# Patient Record
Sex: MALE | Race: WHITE | Employment: FULL TIME | URBAN - METROPOLITAN AREA
[De-identification: names, ages, dates, MRNs, and addresses within clinical notes are randomized per-mention and may not be internally consistent; named-entity substitution may affect disease eponyms.]

---

## 2018-06-22 ENCOUNTER — HOSPITAL ENCOUNTER (EMERGENCY)
Age: 48
Discharge: HOME | End: 2018-06-22
Payer: COMMERCIAL

## 2018-06-22 ENCOUNTER — APPOINTMENT (OUTPATIENT)
Dept: GENERAL RADIOLOGY | Age: 48
End: 2018-06-22
Payer: COMMERCIAL

## 2018-06-22 VITALS
HEIGHT: 70 IN | TEMPERATURE: 98.8 F | SYSTOLIC BLOOD PRESSURE: 148 MMHG | OXYGEN SATURATION: 98 % | HEART RATE: 68 BPM | RESPIRATION RATE: 20 BRPM | WEIGHT: 165 LBS | BODY MASS INDEX: 23.62 KG/M2 | DIASTOLIC BLOOD PRESSURE: 82 MMHG

## 2018-06-22 DIAGNOSIS — M62.838 SPASM OF MUSCLE: Primary | ICD-10-CM

## 2018-06-22 DIAGNOSIS — S16.1XXA ACUTE STRAIN OF NECK MUSCLE, INITIAL ENCOUNTER: ICD-10-CM

## 2018-06-22 LAB
EKG ATRIAL RATE: 62 BPM
EKG P AXIS: 76 DEGREES
EKG P-R INTERVAL: 154 MS
EKG Q-T INTERVAL: 430 MS
EKG QRS DURATION: 98 MS
EKG QTC CALCULATION (BAZETT): 436 MS
EKG R AXIS: 71 DEGREES
EKG T AXIS: 53 DEGREES
EKG VENTRICULAR RATE: 62 BPM

## 2018-06-22 PROCEDURE — 96372 THER/PROPH/DIAG INJ SC/IM: CPT

## 2018-06-22 PROCEDURE — 6370000000 HC RX 637 (ALT 250 FOR IP): Performed by: PHYSICIAN ASSISTANT

## 2018-06-22 PROCEDURE — 6360000002 HC RX W HCPCS: Performed by: PHYSICIAN ASSISTANT

## 2018-06-22 PROCEDURE — 72040 X-RAY EXAM NECK SPINE 2-3 VW: CPT

## 2018-06-22 PROCEDURE — 99283 EMERGENCY DEPT VISIT LOW MDM: CPT

## 2018-06-22 RX ORDER — ORPHENADRINE CITRATE 30 MG/ML
60 INJECTION INTRAMUSCULAR; INTRAVENOUS ONCE
Status: DISCONTINUED | OUTPATIENT
Start: 2018-06-22 | End: 2018-06-22

## 2018-06-22 RX ORDER — KETOROLAC TROMETHAMINE 30 MG/ML
30 INJECTION, SOLUTION INTRAMUSCULAR; INTRAVENOUS EVERY 6 HOURS PRN
Status: DISCONTINUED | OUTPATIENT
Start: 2018-06-22 | End: 2018-06-22 | Stop reason: HOSPADM

## 2018-06-22 RX ORDER — PREDNISONE 20 MG/1
20 TABLET ORAL DAILY
Qty: 15 TABLET | Refills: 0 | Status: SHIPPED | OUTPATIENT
Start: 2018-06-22 | End: 2018-06-29

## 2018-06-22 RX ORDER — ORPHENADRINE CITRATE 30 MG/ML
60 INJECTION INTRAMUSCULAR; INTRAVENOUS ONCE
Status: COMPLETED | OUTPATIENT
Start: 2018-06-22 | End: 2018-06-22

## 2018-06-22 RX ORDER — TIZANIDINE 4 MG/1
4 TABLET ORAL EVERY 6 HOURS PRN
Qty: 21 TABLET | Refills: 0 | Status: SHIPPED | OUTPATIENT
Start: 2018-06-22

## 2018-06-22 RX ORDER — METHYLPREDNISOLONE SODIUM SUCCINATE 125 MG/2ML
125 INJECTION, POWDER, LYOPHILIZED, FOR SOLUTION INTRAMUSCULAR; INTRAVENOUS ONCE
Status: DISCONTINUED | OUTPATIENT
Start: 2018-06-22 | End: 2018-06-22

## 2018-06-22 RX ORDER — ACETAMINOPHEN AND CODEINE PHOSPHATE 300; 30 MG/1; MG/1
1 TABLET ORAL EVERY 4 HOURS PRN
Qty: 30 TABLET | Refills: 0 | Status: SHIPPED | OUTPATIENT
Start: 2018-06-22 | End: 2018-06-27

## 2018-06-22 RX ORDER — HYDROCODONE BITARTRATE AND ACETAMINOPHEN 5; 325 MG/1; MG/1
1 TABLET ORAL ONCE
Status: COMPLETED | OUTPATIENT
Start: 2018-06-22 | End: 2018-06-22

## 2018-06-22 RX ADMIN — KETOROLAC TROMETHAMINE 30 MG: 30 INJECTION, SOLUTION INTRAMUSCULAR at 21:14

## 2018-06-22 RX ADMIN — HYDROCODONE BITARTRATE AND ACETAMINOPHEN 1 TABLET: 5; 325 TABLET ORAL at 19:54

## 2018-06-22 RX ADMIN — ORPHENADRINE CITRATE 60 MG: 30 INJECTION INTRAMUSCULAR; INTRAVENOUS at 19:54

## 2018-06-22 ASSESSMENT — PAIN SCALES - WONG BAKER: WONGBAKER_NUMERICALRESPONSE: 2

## 2018-06-22 ASSESSMENT — PAIN DESCRIPTION - LOCATION
LOCATION: NECK;BACK
LOCATION: NECK

## 2018-06-22 ASSESSMENT — PAIN DESCRIPTION - PAIN TYPE
TYPE: ACUTE PAIN
TYPE: ACUTE PAIN

## 2018-06-22 ASSESSMENT — PAIN SCALES - GENERAL
PAINLEVEL_OUTOF10: 8
PAINLEVEL_OUTOF10: 10
PAINLEVEL_OUTOF10: 5
PAINLEVEL_OUTOF10: 10

## 2018-06-22 ASSESSMENT — PAIN DESCRIPTION - ORIENTATION: ORIENTATION: LEFT

## 2019-05-07 ENCOUNTER — HOSPITAL ENCOUNTER (OUTPATIENT)
Age: 49
Discharge: HOME | End: 2019-05-09

## 2019-05-07 PROCEDURE — 88305 TISSUE EXAM BY PATHOLOGIST: CPT

## 2021-12-10 ENCOUNTER — HOSPITAL ENCOUNTER (OUTPATIENT)
Dept: HOSPITAL 83 - COVID19 | Age: 51
Discharge: HOME | End: 2021-12-10
Attending: STUDENT IN AN ORGANIZED HEALTH CARE EDUCATION/TRAINING PROGRAM
Payer: COMMERCIAL

## 2021-12-10 DIAGNOSIS — Z11.52: Primary | ICD-10-CM

## 2022-08-17 ENCOUNTER — HOSPITAL ENCOUNTER (OUTPATIENT)
Age: 52
Discharge: HOME | End: 2022-08-19

## 2022-08-17 PROCEDURE — 88305 TISSUE EXAM BY PATHOLOGIST: CPT

## 2023-02-23 ENCOUNTER — HOSPITAL ENCOUNTER (OUTPATIENT)
Dept: GENERAL RADIOLOGY | Age: 53
Discharge: HOME | End: 2023-02-25
Payer: COMMERCIAL

## 2023-02-23 ENCOUNTER — HOSPITAL ENCOUNTER (OUTPATIENT)
Age: 53
Discharge: HOME | End: 2023-02-25
Payer: COMMERCIAL

## 2023-02-23 DIAGNOSIS — M25.511 RIGHT SHOULDER PAIN, UNSPECIFIED CHRONICITY: ICD-10-CM

## 2023-02-23 DIAGNOSIS — S43.401A UNSPECIFIED SPRAIN OF RIGHT SHOULDER JOINT, INITIAL ENCOUNTER: ICD-10-CM

## 2023-02-23 DIAGNOSIS — S43.51XA SPRAIN OF ACROMIOCLAVICULAR JOINT, RIGHT, INITIAL ENCOUNTER: ICD-10-CM

## 2023-02-23 PROCEDURE — 73030 X-RAY EXAM OF SHOULDER: CPT

## 2024-03-11 ENCOUNTER — HOSPITAL ENCOUNTER (OUTPATIENT)
Age: 54
Discharge: HOME | End: 2024-03-13

## 2024-03-11 PROCEDURE — 88305 TISSUE EXAM BY PATHOLOGIST: CPT

## 2024-03-11 PROCEDURE — 88342 IMHCHEM/IMCYTCHM 1ST ANTB: CPT

## 2024-03-14 LAB — SURGICAL PATHOLOGY REPORT: NORMAL

## 2025-07-07 ENCOUNTER — APPOINTMENT (OUTPATIENT)
Dept: GENERAL RADIOLOGY | Age: 55
DRG: 321 | End: 2025-07-07
Payer: COMMERCIAL

## 2025-07-07 ENCOUNTER — APPOINTMENT (OUTPATIENT)
Dept: MRI IMAGING | Age: 55
DRG: 321 | End: 2025-07-07
Payer: COMMERCIAL

## 2025-07-07 ENCOUNTER — HOSPITAL ENCOUNTER (INPATIENT)
Age: 55
LOS: 9 days | Discharge: HOME | DRG: 321 | End: 2025-07-16
Attending: EMERGENCY MEDICINE | Admitting: INTERNAL MEDICINE
Payer: COMMERCIAL

## 2025-07-07 ENCOUNTER — APPOINTMENT (OUTPATIENT)
Dept: CT IMAGING | Age: 55
DRG: 321 | End: 2025-07-07
Payer: COMMERCIAL

## 2025-07-07 DIAGNOSIS — I21.3 STEMI (ST ELEVATION MYOCARDIAL INFARCTION) (HCC): ICD-10-CM

## 2025-07-07 DIAGNOSIS — R07.9 CHEST PAIN, UNSPECIFIED TYPE: ICD-10-CM

## 2025-07-07 DIAGNOSIS — I21.3 ST ELEVATION MYOCARDIAL INFARCTION (STEMI), UNSPECIFIED ARTERY (HCC): Primary | ICD-10-CM

## 2025-07-07 DIAGNOSIS — J96.01 ACUTE RESPIRATORY FAILURE WITH HYPOXIA (HCC): ICD-10-CM

## 2025-07-07 LAB
AADO2: 237.3 MMHG
AADO2: 409.8 MMHG
AADO2: 610.1 MMHG
ABO + RH BLD: NORMAL
ACTIVATED CLOTTING TIME, LOW RANGE: 273 SEC
ACTIVATED CLOTTING TIME, LOW RANGE: 346 SEC
ACTIVATED CLOTTING TIME, LOW RANGE: 348 SEC
ALBUMIN SERPL-MCNC: 4.2 G/DL (ref 3.5–5.2)
ALBUMIN SERPL-MCNC: 4.6 G/DL (ref 3.5–5.2)
ALP SERPL-CCNC: 81 U/L (ref 40–129)
ALP SERPL-CCNC: 90 U/L (ref 40–129)
ALT SERPL-CCNC: 153 U/L (ref 0–50)
ALT SERPL-CCNC: 189 U/L (ref 0–50)
ANION GAP SERPL CALCULATED.3IONS-SCNC: 18 MMOL/L (ref 7–16)
ANION GAP SERPL CALCULATED.3IONS-SCNC: 18 MMOL/L (ref 7–16)
ARM BAND NUMBER: NORMAL
AST SERPL-CCNC: 142 U/L (ref 0–50)
AST SERPL-CCNC: 619 U/L (ref 0–50)
B.E.: -10.4 MMOL/L (ref -3–3)
B.E.: -3.5 MMOL/L (ref -3–3)
B.E.: -5 MMOL/L (ref -3–3)
BASOPHILS # BLD: 0.03 K/UL (ref 0–0.2)
BASOPHILS # BLD: 0.07 K/UL (ref 0–0.2)
BASOPHILS NFR BLD: 0 % (ref 0–2)
BASOPHILS NFR BLD: 1 % (ref 0–2)
BILIRUB SERPL-MCNC: 0.3 MG/DL (ref 0–1.2)
BILIRUB SERPL-MCNC: 0.6 MG/DL (ref 0–1.2)
BLOOD BANK SAMPLE EXPIRATION: NORMAL
BLOOD GROUP ANTIBODIES SERPL: NEGATIVE
BUN BLD-MCNC: 14 MG/DL (ref 6–20)
BUN SERPL-MCNC: 14 MG/DL (ref 6–20)
BUN SERPL-MCNC: 16 MG/DL (ref 6–20)
CALCIUM SERPL-MCNC: 8.9 MG/DL (ref 8.6–10)
CALCIUM SERPL-MCNC: 9.5 MG/DL (ref 8.6–10)
CHLORIDE BLD-SCNC: 106 MMOL/L (ref 100–108)
CHLORIDE SERPL-SCNC: 103 MMOL/L (ref 98–107)
CHLORIDE SERPL-SCNC: 104 MMOL/L (ref 98–107)
CK SERPL-CCNC: 6602 U/L (ref 0–190)
CO2 BLD CALC-SCNC: 16 MMOL/L (ref 22–29)
CO2 SERPL-SCNC: 15 MMOL/L (ref 22–29)
CO2 SERPL-SCNC: 17 MMOL/L (ref 22–29)
COHB: 0.3 % (ref 0–1.5)
COHB: 0.9 % (ref 0–1.5)
COHB: 2.2 % (ref 0–1.5)
CREAT BLD-MCNC: 1.2 MG/DL (ref 0.7–1.2)
CREAT SERPL-MCNC: 1.2 MG/DL (ref 0.7–1.2)
CREAT SERPL-MCNC: 1.2 MG/DL (ref 0.7–1.2)
CRITICAL: ABNORMAL
DATE ANALYZED: ABNORMAL
DATE OF COLLECTION: ABNORMAL
ECHO BSA: 1.92 M2
EGFR, POC: 73 ML/MIN/1.73M2
EOSINOPHIL # BLD: 0 K/UL (ref 0.05–0.5)
EOSINOPHIL # BLD: 0.02 K/UL (ref 0.05–0.5)
EOSINOPHILS RELATIVE PERCENT: 0 % (ref 0–6)
EOSINOPHILS RELATIVE PERCENT: 0 % (ref 0–6)
ERYTHROCYTE [DISTWIDTH] IN BLOOD BY AUTOMATED COUNT: 13.2 % (ref 11.5–15)
ERYTHROCYTE [DISTWIDTH] IN BLOOD BY AUTOMATED COUNT: 13.3 % (ref 11.5–15)
ERYTHROCYTE [DISTWIDTH] IN BLOOD BY AUTOMATED COUNT: 13.3 % (ref 11.5–15)
FIO2: 100 %
GFR, ESTIMATED: 71 ML/MIN/1.73M2
GFR, ESTIMATED: 74 ML/MIN/1.73M2
GLUCOSE BLD-MCNC: 216 MG/DL
GLUCOSE BLD-MCNC: 259 MG/DL (ref 74–99)
GLUCOSE SERPL-MCNC: 138 MG/DL (ref 74–99)
GLUCOSE SERPL-MCNC: 246 MG/DL (ref 74–99)
HCO3: 16.3 MMOL/L (ref 22–26)
HCO3: 18.2 MMOL/L (ref 22–26)
HCO3: 18.8 MMOL/L (ref 22–26)
HCT VFR BLD AUTO: 47.5 % (ref 37–54)
HCT VFR BLD AUTO: 47.6 % (ref 37–54)
HCT VFR BLD AUTO: 49.6 % (ref 37–54)
HGB BLD-MCNC: 16.5 G/DL (ref 12.5–16.5)
HGB BLD-MCNC: 17 G/DL (ref 12.5–16.5)
HGB BLD-MCNC: 17.2 G/DL (ref 12.5–16.5)
HHB: 0.4 % (ref 0–5)
HHB: 0.7 % (ref 0–5)
HHB: 13 % (ref 0–5)
IMM GRANULOCYTES # BLD AUTO: 0.06 K/UL (ref 0–0.58)
IMM GRANULOCYTES # BLD AUTO: 0.15 K/UL (ref 0–0.58)
IMM GRANULOCYTES NFR BLD: 0 % (ref 0–5)
IMM GRANULOCYTES NFR BLD: 1 % (ref 0–5)
INR PPP: 1.1
LAB: ABNORMAL
LACTATE BLDV-SCNC: 1.6 MMOL/L (ref 0.5–2.2)
LACTATE BLDV-SCNC: 4.6 MMOL/L (ref 0.5–2.2)
LYMPHOCYTES NFR BLD: 1.7 K/UL (ref 1.5–4)
LYMPHOCYTES NFR BLD: 1.72 K/UL (ref 1.5–4)
LYMPHOCYTES RELATIVE PERCENT: 12 % (ref 20–42)
LYMPHOCYTES RELATIVE PERCENT: 12 % (ref 20–42)
Lab: 1225
Lab: 1655
Lab: 2025
MAGNESIUM SERPL-MCNC: 2 MG/DL (ref 1.6–2.6)
MCH RBC QN AUTO: 32.8 PG (ref 26–35)
MCH RBC QN AUTO: 33.4 PG (ref 26–35)
MCH RBC QN AUTO: 33.5 PG (ref 26–35)
MCHC RBC AUTO-ENTMCNC: 34.7 G/DL (ref 32–34.5)
MCHC RBC AUTO-ENTMCNC: 34.7 G/DL (ref 32–34.5)
MCHC RBC AUTO-ENTMCNC: 35.7 G/DL (ref 32–34.5)
MCV RBC AUTO: 93.7 FL (ref 80–99.9)
MCV RBC AUTO: 94.5 FL (ref 80–99.9)
MCV RBC AUTO: 96.2 FL (ref 80–99.9)
METHB: 0.5 % (ref 0–1.5)
METHB: 0.6 % (ref 0–1.5)
METHB: 0.7 % (ref 0–1.5)
MODE: AC
MODE: AC
MONOCYTES NFR BLD: 0.52 K/UL (ref 0.1–0.95)
MONOCYTES NFR BLD: 0.73 K/UL (ref 0.1–0.95)
MONOCYTES NFR BLD: 4 % (ref 2–12)
MONOCYTES NFR BLD: 5 % (ref 2–12)
NEUTROPHILS NFR BLD: 83 % (ref 43–80)
NEUTROPHILS NFR BLD: 83 % (ref 43–80)
NEUTS SEG NFR BLD: 12.04 K/UL (ref 1.8–7.3)
NEUTS SEG NFR BLD: 12.31 K/UL (ref 1.8–7.3)
O2 SATURATION: 86.8 % (ref 92–98.5)
O2 SATURATION: 99.3 % (ref 92–98.5)
O2 SATURATION: 99.6 % (ref 92–98.5)
O2HB: 85.6 % (ref 94–97)
O2HB: 96.5 % (ref 94–97)
O2HB: 98.6 % (ref 94–97)
OPERATOR ID: 405
OPERATOR ID: 5100
OPERATOR ID: 5100
PARTIAL THROMBOPLASTIN TIME: 122.1 SEC (ref 24.5–35.1)
PARTIAL THROMBOPLASTIN TIME: 31.4 SEC (ref 24.5–35.1)
PATIENT TEMP: 37 C
PCO2: 28.3 MMHG (ref 35–45)
PCO2: 30 MMHG (ref 35–45)
PCO2: 39.1 MMHG (ref 35–45)
PEEP/CPAP: 8 CMH2O
PEEP/CPAP: 8 CMH2O
PFO2: 0.48 MMHG/%
PFO2: 2.39 MMHG/%
PFO2: 4.47 MMHG/%
PH BLOOD GAS: 7.24 (ref 7.35–7.45)
PH BLOOD GAS: 7.4 (ref 7.35–7.45)
PH BLOOD GAS: 7.44 (ref 7.35–7.45)
PHOSPHATE SERPL-MCNC: 4.2 MG/DL (ref 2.5–4.5)
PLATELET # BLD AUTO: 240 K/UL (ref 130–450)
PLATELET # BLD AUTO: 248 K/UL (ref 130–450)
PLATELET # BLD AUTO: 266 K/UL (ref 130–450)
PMV BLD AUTO: 11.2 FL (ref 7–12)
PMV BLD AUTO: 11.5 FL (ref 7–12)
PMV BLD AUTO: 11.6 FL (ref 7–12)
PO2: 239.1 MMHG (ref 75–100)
PO2: 447.4 MMHG (ref 75–100)
PO2: 47.9 MMHG (ref 75–100)
POC ANION GAP: 14 MMOL/L (ref 7–16)
POTASSIUM BLD-SCNC: 3.7 MMOL/L (ref 3.5–5)
POTASSIUM SERPL-SCNC: 3.51 MMOL/L (ref 3.5–5)
POTASSIUM SERPL-SCNC: 3.8 MMOL/L (ref 3.5–5.1)
POTASSIUM SERPL-SCNC: 4.6 MMOL/L (ref 3.5–5.1)
PROCALCITONIN SERPL-MCNC: 0.91 NG/ML (ref 0–0.08)
PROT SERPL-MCNC: 6.6 G/DL (ref 6.4–8.3)
PROT SERPL-MCNC: 7.1 G/DL (ref 6.4–8.3)
PROTHROMBIN TIME: 11.5 SEC (ref 9.3–12.4)
RBC # BLD AUTO: 4.94 M/UL (ref 3.8–5.8)
RBC # BLD AUTO: 5.08 M/UL (ref 3.8–5.8)
RBC # BLD AUTO: 5.25 M/UL (ref 3.8–5.8)
RI(T): 0.53
RI(T): 1.71
RI(T): 12.74
RR MECHANICAL: 18 B/MIN
RR MECHANICAL: 20 B/MIN
SODIUM BLD-SCNC: 136 MMOL/L (ref 132–146)
SODIUM SERPL-SCNC: 136 MMOL/L (ref 136–145)
SODIUM SERPL-SCNC: 139 MMOL/L (ref 136–145)
SOURCE, BLOOD GAS: ABNORMAL
THB: 16.3 G/DL (ref 11.5–16.5)
THB: 17.5 G/DL (ref 11.5–16.5)
THB: 17.8 G/DL (ref 11.5–16.5)
TIME ANALYZED: 1226
TIME ANALYZED: 1657
TIME ANALYZED: 2027
TROPONIN I SERPL HS-MCNC: 50 NG/L (ref 0–22)
TROPONIN I SERPL HS-MCNC: 8709 NG/L (ref 0–22)
VT MECHANICAL: 450 ML
VT MECHANICAL: 450 ML
WBC OTHER # BLD: 14.6 K/UL (ref 4.5–11.5)
WBC OTHER # BLD: 14.8 K/UL (ref 4.5–11.5)
WBC OTHER # BLD: 17.6 K/UL (ref 4.5–11.5)

## 2025-07-07 PROCEDURE — 92941 PRQ TRLML REVSC TOT OCCL AMI: CPT | Performed by: INTERNAL MEDICINE

## 2025-07-07 PROCEDURE — 99291 CRITICAL CARE FIRST HOUR: CPT | Performed by: INTERNAL MEDICINE

## 2025-07-07 PROCEDURE — 2580000003 HC RX 258: Performed by: INTERNAL MEDICINE

## 2025-07-07 PROCEDURE — 2500000003 HC RX 250 WO HCPCS: Performed by: INTERNAL MEDICINE

## 2025-07-07 PROCEDURE — 84484 ASSAY OF TROPONIN QUANT: CPT

## 2025-07-07 PROCEDURE — C1757 CATH, THROMBECTOMY/EMBOLECT: HCPCS | Performed by: INTERNAL MEDICINE

## 2025-07-07 PROCEDURE — 6360000004 HC RX CONTRAST MEDICATION: Performed by: INTERNAL MEDICINE

## 2025-07-07 PROCEDURE — 6360000002 HC RX W HCPCS: Performed by: EMERGENCY MEDICINE

## 2025-07-07 PROCEDURE — B2111ZZ FLUOROSCOPY OF MULTIPLE CORONARY ARTERIES USING LOW OSMOLAR CONTRAST: ICD-10-PCS | Performed by: INTERNAL MEDICINE

## 2025-07-07 PROCEDURE — 80053 COMPREHEN METABOLIC PANEL: CPT

## 2025-07-07 PROCEDURE — 0BH17EZ INSERTION OF ENDOTRACHEAL AIRWAY INTO TRACHEA, VIA NATURAL OR ARTIFICIAL OPENING: ICD-10-PCS

## 2025-07-07 PROCEDURE — 6370000000 HC RX 637 (ALT 250 FOR IP): Performed by: INTERNAL MEDICINE

## 2025-07-07 PROCEDURE — 85347 COAGULATION TIME ACTIVATED: CPT

## 2025-07-07 PROCEDURE — 80051 ELECTROLYTE PANEL: CPT

## 2025-07-07 PROCEDURE — 87081 CULTURE SCREEN ONLY: CPT

## 2025-07-07 PROCEDURE — 92973 PRQ TRLUML C MCHN ASP THRMBC: CPT | Performed by: INTERNAL MEDICINE

## 2025-07-07 PROCEDURE — C1874 STENT, COATED/COV W/DEL SYS: HCPCS | Performed by: INTERNAL MEDICINE

## 2025-07-07 PROCEDURE — 85025 COMPLETE CBC W/AUTO DIFF WBC: CPT

## 2025-07-07 PROCEDURE — 74018 RADEX ABDOMEN 1 VIEW: CPT

## 2025-07-07 PROCEDURE — 84100 ASSAY OF PHOSPHORUS: CPT

## 2025-07-07 PROCEDURE — 84132 ASSAY OF SERUM POTASSIUM: CPT

## 2025-07-07 PROCEDURE — 94640 AIRWAY INHALATION TREATMENT: CPT

## 2025-07-07 PROCEDURE — 0202U NFCT DS 22 TRGT SARS-COV-2: CPT

## 2025-07-07 PROCEDURE — A9579 GAD-BASE MR CONTRAST NOS,1ML: HCPCS | Performed by: RADIOLOGY

## 2025-07-07 PROCEDURE — 83735 ASSAY OF MAGNESIUM: CPT

## 2025-07-07 PROCEDURE — 83605 ASSAY OF LACTIC ACID: CPT

## 2025-07-07 PROCEDURE — 70450 CT HEAD/BRAIN W/O DYE: CPT

## 2025-07-07 PROCEDURE — 86850 RBC ANTIBODY SCREEN: CPT

## 2025-07-07 PROCEDURE — 2709999900 HC NON-CHARGEABLE SUPPLY: Performed by: INTERNAL MEDICINE

## 2025-07-07 PROCEDURE — 6360000002 HC RX W HCPCS: Performed by: INTERNAL MEDICINE

## 2025-07-07 PROCEDURE — 85610 PROTHROMBIN TIME: CPT

## 2025-07-07 PROCEDURE — 93005 ELECTROCARDIOGRAM TRACING: CPT | Performed by: EMERGENCY MEDICINE

## 2025-07-07 PROCEDURE — 86901 BLOOD TYPING SEROLOGIC RH(D): CPT

## 2025-07-07 PROCEDURE — 4A023N7 MEASUREMENT OF CARDIAC SAMPLING AND PRESSURE, LEFT HEART, PERCUTANEOUS APPROACH: ICD-10-PCS | Performed by: INTERNAL MEDICINE

## 2025-07-07 PROCEDURE — 82805 BLOOD GASES W/O2 SATURATION: CPT

## 2025-07-07 PROCEDURE — 99291 CRITICAL CARE FIRST HOUR: CPT | Performed by: NURSE PRACTITIONER

## 2025-07-07 PROCEDURE — 93458 L HRT ARTERY/VENTRICLE ANGIO: CPT | Performed by: INTERNAL MEDICINE

## 2025-07-07 PROCEDURE — 85730 THROMBOPLASTIN TIME PARTIAL: CPT

## 2025-07-07 PROCEDURE — 85027 COMPLETE CBC AUTOMATED: CPT

## 2025-07-07 PROCEDURE — 6360000002 HC RX W HCPCS

## 2025-07-07 PROCEDURE — C1769 GUIDE WIRE: HCPCS | Performed by: INTERNAL MEDICINE

## 2025-07-07 PROCEDURE — 6370000000 HC RX 637 (ALT 250 FOR IP): Performed by: NURSE PRACTITIONER

## 2025-07-07 PROCEDURE — 2500000003 HC RX 250 WO HCPCS: Performed by: NURSE PRACTITIONER

## 2025-07-07 PROCEDURE — 92928 PRQ TCAT PLMT NTRAC ST 1 LES: CPT | Performed by: INTERNAL MEDICINE

## 2025-07-07 PROCEDURE — 02C03ZZ EXTIRPATION OF MATTER FROM CORONARY ARTERY, ONE ARTERY, PERCUTANEOUS APPROACH: ICD-10-PCS | Performed by: INTERNAL MEDICINE

## 2025-07-07 PROCEDURE — 94002 VENT MGMT INPAT INIT DAY: CPT

## 2025-07-07 PROCEDURE — C1887 CATHETER, GUIDING: HCPCS | Performed by: INTERNAL MEDICINE

## 2025-07-07 PROCEDURE — 5A12012 PERFORMANCE OF CARDIAC OUTPUT, SINGLE, MANUAL: ICD-10-PCS | Performed by: INTERNAL MEDICINE

## 2025-07-07 PROCEDURE — C1725 CATH, TRANSLUMIN NON-LASER: HCPCS | Performed by: INTERNAL MEDICINE

## 2025-07-07 PROCEDURE — 6370000000 HC RX 637 (ALT 250 FOR IP)

## 2025-07-07 PROCEDURE — 31500 INSERT EMERGENCY AIRWAY: CPT

## 2025-07-07 PROCEDURE — 86900 BLOOD TYPING SEROLOGIC ABO: CPT

## 2025-07-07 PROCEDURE — 99285 EMERGENCY DEPT VISIT HI MDM: CPT

## 2025-07-07 PROCEDURE — 2500000003 HC RX 250 WO HCPCS

## 2025-07-07 PROCEDURE — 71045 X-RAY EXAM CHEST 1 VIEW: CPT

## 2025-07-07 PROCEDURE — 6370000000 HC RX 637 (ALT 250 FOR IP): Performed by: EMERGENCY MEDICINE

## 2025-07-07 PROCEDURE — 84145 PROCALCITONIN (PCT): CPT

## 2025-07-07 PROCEDURE — 027135Z DILATION OF CORONARY ARTERY, TWO ARTERIES WITH TWO DRUG-ELUTING INTRALUMINAL DEVICES, PERCUTANEOUS APPROACH: ICD-10-PCS | Performed by: INTERNAL MEDICINE

## 2025-07-07 PROCEDURE — 2000000000 HC ICU R&B

## 2025-07-07 PROCEDURE — 6360000002 HC RX W HCPCS: Performed by: NURSE PRACTITIONER

## 2025-07-07 PROCEDURE — 70553 MRI BRAIN STEM W/O & W/DYE: CPT

## 2025-07-07 PROCEDURE — 5A1945Z RESPIRATORY VENTILATION, 24-96 CONSECUTIVE HOURS: ICD-10-PCS | Performed by: INTERNAL MEDICINE

## 2025-07-07 PROCEDURE — 6360000004 HC RX CONTRAST MEDICATION: Performed by: RADIOLOGY

## 2025-07-07 PROCEDURE — 82550 ASSAY OF CK (CPK): CPT

## 2025-07-07 PROCEDURE — 84520 ASSAY OF UREA NITROGEN: CPT

## 2025-07-07 PROCEDURE — 82565 ASSAY OF CREATININE: CPT

## 2025-07-07 PROCEDURE — C1894 INTRO/SHEATH, NON-LASER: HCPCS | Performed by: INTERNAL MEDICINE

## 2025-07-07 PROCEDURE — 82947 ASSAY GLUCOSE BLOOD QUANT: CPT

## 2025-07-07 DEVICE — STENT ONYXNG35018UX ONYX 3.50X18RX
Type: IMPLANTABLE DEVICE | Status: FUNCTIONAL
Brand: ONYX FRONTIER™

## 2025-07-07 DEVICE — STENT ONYXNG35015UX ONYX 3.50X15RX
Type: IMPLANTABLE DEVICE | Status: FUNCTIONAL
Brand: ONYX FRONTIER™

## 2025-07-07 RX ORDER — VERAPAMIL HYDROCHLORIDE 2.5 MG/ML
INJECTION INTRAVENOUS PRN
Status: DISCONTINUED | OUTPATIENT
Start: 2025-07-07 | End: 2025-07-07 | Stop reason: HOSPADM

## 2025-07-07 RX ORDER — ETOMIDATE 2 MG/ML
20 INJECTION INTRAVENOUS ONCE
Status: COMPLETED | OUTPATIENT
Start: 2025-07-07 | End: 2025-07-07

## 2025-07-07 RX ORDER — ROCURONIUM BROMIDE 10 MG/ML
100 INJECTION, SOLUTION INTRAVENOUS ONCE
Status: COMPLETED | OUTPATIENT
Start: 2025-07-07 | End: 2025-07-07

## 2025-07-07 RX ORDER — MAGNESIUM SULFATE IN WATER 40 MG/ML
2000 INJECTION, SOLUTION INTRAVENOUS PRN
Status: DISCONTINUED | OUTPATIENT
Start: 2025-07-07 | End: 2025-07-16 | Stop reason: HOSPADM

## 2025-07-07 RX ORDER — ACETAMINOPHEN 325 MG/1
650 TABLET ORAL EVERY 4 HOURS PRN
Status: DISCONTINUED | OUTPATIENT
Start: 2025-07-07 | End: 2025-07-10

## 2025-07-07 RX ORDER — NITROGLYCERIN 20 MG/100ML
INJECTION INTRAVENOUS PRN
Status: DISCONTINUED | OUTPATIENT
Start: 2025-07-07 | End: 2025-07-07 | Stop reason: HOSPADM

## 2025-07-07 RX ORDER — CHLORHEXIDINE GLUCONATE ORAL RINSE 1.2 MG/ML
15 SOLUTION DENTAL 2 TIMES DAILY
Status: DISCONTINUED | OUTPATIENT
Start: 2025-07-07 | End: 2025-07-10

## 2025-07-07 RX ORDER — ASPIRIN 81 MG/1
81 TABLET ORAL DAILY
Status: DISCONTINUED | OUTPATIENT
Start: 2025-07-07 | End: 2025-07-07

## 2025-07-07 RX ORDER — SODIUM CHLORIDE 9 MG/ML
INJECTION, SOLUTION INTRAVENOUS CONTINUOUS
Status: ACTIVE | OUTPATIENT
Start: 2025-07-07 | End: 2025-07-07

## 2025-07-07 RX ORDER — ASPIRIN 325 MG
325 TABLET ORAL ONCE
Status: COMPLETED | OUTPATIENT
Start: 2025-07-07 | End: 2025-07-07

## 2025-07-07 RX ORDER — IOPAMIDOL 755 MG/ML
INJECTION, SOLUTION INTRAVASCULAR PRN
Status: DISCONTINUED | OUTPATIENT
Start: 2025-07-07 | End: 2025-07-07 | Stop reason: HOSPADM

## 2025-07-07 RX ORDER — LOSARTAN POTASSIUM 25 MG/1
25 TABLET ORAL DAILY
Status: DISCONTINUED | OUTPATIENT
Start: 2025-07-07 | End: 2025-07-16 | Stop reason: HOSPADM

## 2025-07-07 RX ORDER — SODIUM CHLORIDE 0.9 % (FLUSH) 0.9 %
5-40 SYRINGE (ML) INJECTION EVERY 12 HOURS SCHEDULED
Status: DISCONTINUED | OUTPATIENT
Start: 2025-07-07 | End: 2025-07-16 | Stop reason: HOSPADM

## 2025-07-07 RX ORDER — ROSUVASTATIN CALCIUM 20 MG/1
40 TABLET, COATED ORAL NIGHTLY
Status: DISCONTINUED | OUTPATIENT
Start: 2025-07-07 | End: 2025-07-16 | Stop reason: HOSPADM

## 2025-07-07 RX ORDER — MIDAZOLAM HYDROCHLORIDE 1 MG/ML
1-10 INJECTION, SOLUTION INTRAVENOUS CONTINUOUS
Status: DISCONTINUED | OUTPATIENT
Start: 2025-07-07 | End: 2025-07-10

## 2025-07-07 RX ORDER — PROPOFOL 10 MG/ML
5-50 INJECTION, EMULSION INTRAVENOUS CONTINUOUS
Status: DISCONTINUED | OUTPATIENT
Start: 2025-07-07 | End: 2025-07-10

## 2025-07-07 RX ORDER — HEPARIN SODIUM 1000 [USP'U]/ML
2000 INJECTION, SOLUTION INTRAVENOUS; SUBCUTANEOUS PRN
Status: DISCONTINUED | OUTPATIENT
Start: 2025-07-07 | End: 2025-07-07

## 2025-07-07 RX ORDER — HEPARIN SODIUM 10000 [USP'U]/100ML
5-30 INJECTION, SOLUTION INTRAVENOUS CONTINUOUS
Status: DISCONTINUED | OUTPATIENT
Start: 2025-07-07 | End: 2025-07-07

## 2025-07-07 RX ORDER — SODIUM CHLORIDE 9 MG/ML
INJECTION, SOLUTION INTRAVENOUS PRN
Status: DISCONTINUED | OUTPATIENT
Start: 2025-07-07 | End: 2025-07-16 | Stop reason: HOSPADM

## 2025-07-07 RX ORDER — GADOTERIDOL 279.3 MG/ML
15 INJECTION INTRAVENOUS
Status: COMPLETED | OUTPATIENT
Start: 2025-07-07 | End: 2025-07-07

## 2025-07-07 RX ORDER — SODIUM CHLORIDE 0.9 % (FLUSH) 0.9 %
5-40 SYRINGE (ML) INJECTION PRN
Status: DISCONTINUED | OUTPATIENT
Start: 2025-07-07 | End: 2025-07-16 | Stop reason: HOSPADM

## 2025-07-07 RX ORDER — CARVEDILOL 6.25 MG/1
3.12 TABLET ORAL 2 TIMES DAILY WITH MEALS
Status: DISCONTINUED | OUTPATIENT
Start: 2025-07-07 | End: 2025-07-09

## 2025-07-07 RX ORDER — ACETAMINOPHEN 325 MG/1
650 TABLET ORAL EVERY 6 HOURS PRN
Status: DISCONTINUED | OUTPATIENT
Start: 2025-07-07 | End: 2025-07-16 | Stop reason: HOSPADM

## 2025-07-07 RX ORDER — HEPARIN SODIUM 1000 [USP'U]/ML
INJECTION, SOLUTION INTRAVENOUS; SUBCUTANEOUS PRN
Status: DISCONTINUED | OUTPATIENT
Start: 2025-07-07 | End: 2025-07-07 | Stop reason: HOSPADM

## 2025-07-07 RX ORDER — TICAGRELOR 60 MG/1
TABLET, FILM COATED ORAL PRN
Status: DISCONTINUED | OUTPATIENT
Start: 2025-07-07 | End: 2025-07-07 | Stop reason: HOSPADM

## 2025-07-07 RX ORDER — HEPARIN SODIUM 10000 [USP'U]/100ML
INJECTION, SOLUTION INTRAVENOUS
Status: COMPLETED
Start: 2025-07-07 | End: 2025-07-07

## 2025-07-07 RX ORDER — PROPOFOL 10 MG/ML
INJECTION, EMULSION INTRAVENOUS CONTINUOUS PRN
Status: COMPLETED | OUTPATIENT
Start: 2025-07-07 | End: 2025-07-07

## 2025-07-07 RX ORDER — MIDAZOLAM HYDROCHLORIDE 2 MG/2ML
1 INJECTION, SOLUTION INTRAMUSCULAR; INTRAVENOUS EVERY 30 MIN PRN
Status: DISCONTINUED | OUTPATIENT
Start: 2025-07-07 | End: 2025-07-10

## 2025-07-07 RX ORDER — METOPROLOL TARTRATE 1 MG/ML
5 INJECTION, SOLUTION INTRAVENOUS ONCE
Status: COMPLETED | OUTPATIENT
Start: 2025-07-07 | End: 2025-07-07

## 2025-07-07 RX ORDER — ONDANSETRON 2 MG/ML
4 INJECTION INTRAMUSCULAR; INTRAVENOUS EVERY 6 HOURS PRN
Status: DISCONTINUED | OUTPATIENT
Start: 2025-07-07 | End: 2025-07-16 | Stop reason: HOSPADM

## 2025-07-07 RX ORDER — ASPIRIN 81 MG/1
81 TABLET, CHEWABLE ORAL DAILY
Status: DISCONTINUED | OUTPATIENT
Start: 2025-07-07 | End: 2025-07-15

## 2025-07-07 RX ORDER — POLYETHYLENE GLYCOL 3350 17 G/17G
17 POWDER, FOR SOLUTION ORAL DAILY PRN
Status: DISCONTINUED | OUTPATIENT
Start: 2025-07-07 | End: 2025-07-16 | Stop reason: HOSPADM

## 2025-07-07 RX ORDER — POTASSIUM CHLORIDE 29.8 MG/ML
20 INJECTION INTRAVENOUS PRN
Status: DISCONTINUED | OUTPATIENT
Start: 2025-07-07 | End: 2025-07-16 | Stop reason: HOSPADM

## 2025-07-07 RX ORDER — MIDAZOLAM HYDROCHLORIDE 2 MG/2ML
2 INJECTION, SOLUTION INTRAMUSCULAR; INTRAVENOUS ONCE
Status: COMPLETED | OUTPATIENT
Start: 2025-07-07 | End: 2025-07-07

## 2025-07-07 RX ORDER — LEVETIRACETAM 500 MG/5ML
500 INJECTION, SOLUTION, CONCENTRATE INTRAVENOUS EVERY 12 HOURS
Status: DISCONTINUED | OUTPATIENT
Start: 2025-07-08 | End: 2025-07-16 | Stop reason: HOSPADM

## 2025-07-07 RX ORDER — IPRATROPIUM BROMIDE AND ALBUTEROL SULFATE 2.5; .5 MG/3ML; MG/3ML
1 SOLUTION RESPIRATORY (INHALATION)
Status: DISCONTINUED | OUTPATIENT
Start: 2025-07-07 | End: 2025-07-11

## 2025-07-07 RX ORDER — ACETAMINOPHEN 650 MG/1
650 SUPPOSITORY RECTAL EVERY 6 HOURS PRN
Status: DISCONTINUED | OUTPATIENT
Start: 2025-07-07 | End: 2025-07-16 | Stop reason: HOSPADM

## 2025-07-07 RX ORDER — HEPARIN SODIUM 10000 [USP'U]/ML
INJECTION, SOLUTION INTRAVENOUS; SUBCUTANEOUS PRN
Status: DISCONTINUED | OUTPATIENT
Start: 2025-07-07 | End: 2025-07-07 | Stop reason: HOSPADM

## 2025-07-07 RX ORDER — LEVETIRACETAM 500 MG/5ML
1500 INJECTION, SOLUTION, CONCENTRATE INTRAVENOUS ONCE
Status: COMPLETED | OUTPATIENT
Start: 2025-07-07 | End: 2025-07-07

## 2025-07-07 RX ORDER — POTASSIUM CHLORIDE 7.45 MG/ML
10 INJECTION INTRAVENOUS PRN
Status: DISCONTINUED | OUTPATIENT
Start: 2025-07-07 | End: 2025-07-16 | Stop reason: HOSPADM

## 2025-07-07 RX ORDER — FUROSEMIDE 10 MG/ML
INJECTION INTRAMUSCULAR; INTRAVENOUS PRN
Status: DISCONTINUED | OUTPATIENT
Start: 2025-07-07 | End: 2025-07-07 | Stop reason: HOSPADM

## 2025-07-07 RX ORDER — TICAGRELOR 90 MG/1
90 TABLET, FILM COATED ORAL 2 TIMES DAILY
Status: DISCONTINUED | OUTPATIENT
Start: 2025-07-08 | End: 2025-07-16 | Stop reason: HOSPADM

## 2025-07-07 RX ORDER — ONDANSETRON 4 MG/1
4 TABLET, ORALLY DISINTEGRATING ORAL EVERY 8 HOURS PRN
Status: DISCONTINUED | OUTPATIENT
Start: 2025-07-07 | End: 2025-07-16 | Stop reason: HOSPADM

## 2025-07-07 RX ORDER — OXYCODONE AND ACETAMINOPHEN 5; 325 MG/1; MG/1
1 TABLET ORAL EVERY 4 HOURS PRN
Refills: 0 | Status: DISCONTINUED | OUTPATIENT
Start: 2025-07-07 | End: 2025-07-14 | Stop reason: SDUPTHER

## 2025-07-07 RX ORDER — HEPARIN SODIUM 1000 [USP'U]/ML
4000 INJECTION, SOLUTION INTRAVENOUS; SUBCUTANEOUS PRN
Status: DISCONTINUED | OUTPATIENT
Start: 2025-07-07 | End: 2025-07-07

## 2025-07-07 RX ADMIN — SODIUM CHLORIDE, PRESERVATIVE FREE 10 ML: 5 INJECTION INTRAVENOUS at 20:52

## 2025-07-07 RX ADMIN — IPRATROPIUM BROMIDE AND ALBUTEROL SULFATE 1 DOSE: .5; 2.5 SOLUTION RESPIRATORY (INHALATION) at 20:20

## 2025-07-07 RX ADMIN — Medication 2 MG/HR: at 21:59

## 2025-07-07 RX ADMIN — GADOTERIDOL 15 ML: 279.3 INJECTION, SOLUTION INTRAVENOUS at 13:58

## 2025-07-07 RX ADMIN — METOROPROLOL TARTRATE 5 MG: 5 INJECTION, SOLUTION INTRAVENOUS at 13:09

## 2025-07-07 RX ADMIN — ETOMIDATE 20 MG: 2 INJECTION, SOLUTION INTRAVENOUS at 12:18

## 2025-07-07 RX ADMIN — ROCURONIUM BROMIDE 100 MG: 10 INJECTION, SOLUTION INTRAVENOUS at 12:18

## 2025-07-07 RX ADMIN — ASPIRIN 325 MG ORAL TABLET 325 MG: 325 PILL ORAL at 12:53

## 2025-07-07 RX ADMIN — PROPOFOL 35 MCG/KG/MIN: 10 INJECTION, EMULSION INTRAVENOUS at 22:49

## 2025-07-07 RX ADMIN — NITROGLYCERIN 0.5 INCH: 20 OINTMENT TOPICAL at 13:10

## 2025-07-07 RX ADMIN — IPRATROPIUM BROMIDE AND ALBUTEROL SULFATE 1 DOSE: .5; 2.5 SOLUTION RESPIRATORY (INHALATION) at 23:48

## 2025-07-07 RX ADMIN — MIDAZOLAM HYDROCHLORIDE 2 MG: 1 INJECTION, SOLUTION INTRAMUSCULAR; INTRAVENOUS at 15:30

## 2025-07-07 RX ADMIN — PROPOFOL 20 MCG/KG/MIN: 10 INJECTION, EMULSION INTRAVENOUS at 13:16

## 2025-07-07 RX ADMIN — PROPOFOL 40 MCG/KG/MIN: 10 INJECTION, EMULSION INTRAVENOUS at 19:03

## 2025-07-07 RX ADMIN — CARVEDILOL 3.12 MG: 6.25 TABLET, FILM COATED ORAL at 18:57

## 2025-07-07 RX ADMIN — MIDAZOLAM HYDROCHLORIDE 2 MG: 1 INJECTION, SOLUTION INTRAMUSCULAR; INTRAVENOUS at 19:49

## 2025-07-07 RX ADMIN — SODIUM CHLORIDE: 0.9 INJECTION, SOLUTION INTRAVENOUS at 18:22

## 2025-07-07 RX ADMIN — HEPARIN SODIUM 12 UNITS/KG/HR: 10000 INJECTION, SOLUTION INTRAVENOUS at 16:15

## 2025-07-07 RX ADMIN — SODIUM CHLORIDE, PRESERVATIVE FREE 40 MG: 5 INJECTION INTRAVENOUS at 20:59

## 2025-07-07 RX ADMIN — HEPARIN SODIUM AND DEXTROSE 12 UNITS/KG/HR: 10000; 5 INJECTION INTRAVENOUS at 16:15

## 2025-07-07 RX ADMIN — MIDAZOLAM HYDROCHLORIDE 2 MG: 1 INJECTION, SOLUTION INTRAMUSCULAR; INTRAVENOUS at 14:39

## 2025-07-07 RX ADMIN — CHLORHEXIDINE GLUCONATE, 0.12% ORAL RINSE 15 ML: 1.2 SOLUTION DENTAL at 20:53

## 2025-07-07 RX ADMIN — ROSUVASTATIN 40 MG: 20 TABLET, FILM COATED ORAL at 20:54

## 2025-07-07 RX ADMIN — LEVETIRACETAM 1500 MG: 100 INJECTION INTRAVENOUS at 22:25

## 2025-07-07 ASSESSMENT — PULMONARY FUNCTION TESTS
PIF_VALUE: 39
PIF_VALUE: 19
PIF_VALUE: 37
PIF_VALUE: 31
PIF_VALUE: 26
PIF_VALUE: 29
PIF_VALUE: 25
PIF_VALUE: 23
PIF_VALUE: 25
PIF_VALUE: 22
PIF_VALUE: 26
PIF_VALUE: 23

## 2025-07-07 ASSESSMENT — LIFESTYLE VARIABLES
HOW MANY STANDARD DRINKS CONTAINING ALCOHOL DO YOU HAVE ON A TYPICAL DAY: PATIENT UNABLE TO ANSWER
HOW OFTEN DO YOU HAVE A DRINK CONTAINING ALCOHOL: PATIENT UNABLE TO ANSWER

## 2025-07-07 NOTE — ED NOTES
Cardiology at bedside.  Patient will go to cath lab once CT image is officially read by radiologist.

## 2025-07-07 NOTE — ED PROVIDER NOTES
inferior STEMI.  Heart alert was called, recommended CT head for ruling out ICH.  Patient started on propofol for sedation.  Head CT showing questionable area of hemorrhage.  Consulted with neurosurgery who agree with radiologist to obtain stat MRI.  MRI showing concern for cavernoma, showing no acute hemorrhage.  Interventional cardiology reconsulted to page Cath Lab.  Family was updated.  Spoke with inpatient hospitalist admitting team, accepted to CVICU close to Cath Lab.  Patient admitted in critical condition.    CONSULTS:   IP CONSULT TO HOSPITALIST  IP CONSULT TO NEUROSURGERY      PROCEDURES   Unless otherwise noted below, none       PROCEDURE  7/7/25       Time: 1220    INTUBATION  Risks, benefits and alternatives if able (for applicable procedures below) described.   Performed By: Alfred Sanchez II, DO and EM Attending Physician.    Indication:  airway protection.   Informed consent: Consent unable to be obtained due to the emergent nature of this procedure..  Procedure: Following Preoxygenation the patient was pretreated with etomidate followed by rocuronium. Intubation was performed after single attempt(s) by direct laryngoscopy using a MAC 4 and 7.5mm cuffed () endotracheal tube was inserted .  Initial post procedure placement:  confirmed by bilateral breath sounds, ETCO2 detection, and absence of sounds over stomach.  Tube Secured @ 24cm at the Lip.   Post procedure chest x-ray: showed appropriate tube position.  Procedural Complications: None.   Anesthesia Consult:  No.           CRITICAL CARE TIME (.cct)         I am the Primary Clinician of Record.    FINAL IMPRESSION      1. ST elevation myocardial infarction (STEMI), unspecified artery (HCC)    2. STEMI (ST elevation myocardial infarction) (HCC)    3. Acute respiratory failure with hypoxia (HCC)          DISPOSITION/PLAN     DISPOSITION Admitted 07/07/2025 12:47:20 PM      PATIENT REFERRED TO:  No follow-up provider specified.    DISCHARGE

## 2025-07-07 NOTE — ED NOTES
Time Heart Alert called:  1218  Cardiology paged:  [phone]  Cardiology call back:  2612  Patient to Cath Lab:

## 2025-07-07 NOTE — ED NOTES
Time Heart Alert called:  1218    Cardiology paged:  [phone]  Cardiology call back:    Patient to Cath Lab:

## 2025-07-07 NOTE — ED NOTES
EMS arrived on scene and the pt was unresponsive and no pulse was found, they began CPR via HUMBERTO, they checked and no pulse was found, pt was without pulse for 5 min before they got a rhythm of VFIB, he was shocked once, they gave 4,000 unit of Heparin and Narcan vis his IO (Right Tib) due to restricted pupils. Pt had agonal breathing and was placed on oxygen

## 2025-07-07 NOTE — ED NOTES
Dr. Truong Attending  Dr. Sanchez Resident  [facility]   Sondra RN   Bandar RN   Ricardo Rodriguez RT

## 2025-07-08 ENCOUNTER — HOSPITAL ENCOUNTER (INPATIENT)
Age: 55
Discharge: HOME | DRG: 321 | End: 2025-07-10
Attending: INTERNAL MEDICINE
Payer: COMMERCIAL

## 2025-07-08 ENCOUNTER — APPOINTMENT (OUTPATIENT)
Dept: NEUROLOGY | Age: 55
DRG: 321 | End: 2025-07-08
Payer: COMMERCIAL

## 2025-07-08 ENCOUNTER — APPOINTMENT (OUTPATIENT)
Dept: GENERAL RADIOLOGY | Age: 55
DRG: 321 | End: 2025-07-08
Payer: COMMERCIAL

## 2025-07-08 ENCOUNTER — APPOINTMENT (OUTPATIENT)
Age: 55
DRG: 321 | End: 2025-07-08
Attending: INTERNAL MEDICINE
Payer: COMMERCIAL

## 2025-07-08 PROBLEM — G93.1 HYPOXIC ENCEPHALOPATHY (HCC): Status: ACTIVE | Noted: 2025-07-08

## 2025-07-08 LAB
AADO2: 152.2 MMHG
ALBUMIN SERPL-MCNC: 4 G/DL (ref 3.5–5.2)
ALP SERPL-CCNC: 72 U/L (ref 40–129)
ALT SERPL-CCNC: 187 U/L (ref 0–50)
ANION GAP SERPL CALCULATED.3IONS-SCNC: 15 MMOL/L (ref 7–16)
AST SERPL-CCNC: 602 U/L (ref 0–50)
B PARAP IS1001 DNA NPH QL NAA+NON-PROBE: NOT DETECTED
B PERT DNA SPEC QL NAA+PROBE: NOT DETECTED
B.E.: -2.9 MMOL/L (ref -3–3)
BASOPHILS # BLD: 0.01 K/UL (ref 0–0.2)
BASOPHILS NFR BLD: 0 % (ref 0–2)
BILIRUB SERPL-MCNC: 0.5 MG/DL (ref 0–1.2)
BNP SERPL-MCNC: 2057 PG/ML (ref 0–125)
BUN SERPL-MCNC: 15 MG/DL (ref 6–20)
C PNEUM DNA NPH QL NAA+NON-PROBE: NOT DETECTED
CALCIUM SERPL-MCNC: 8.9 MG/DL (ref 8.6–10)
CHLORIDE SERPL-SCNC: 105 MMOL/L (ref 98–107)
CO2 SERPL-SCNC: 18 MMOL/L (ref 22–29)
COHB: 0.6 % (ref 0–1.5)
CREAT SERPL-MCNC: 1.1 MG/DL (ref 0.7–1.2)
CRITICAL: ABNORMAL
DATE ANALYZED: ABNORMAL
DATE OF COLLECTION: ABNORMAL
ECHO AV AREA PEAK VELOCITY: 2.3 CM2
ECHO AV AREA VTI: 2.8 CM2
ECHO AV AREA/BSA PEAK VELOCITY: 1.2 CM2/M2
ECHO AV AREA/BSA VTI: 1.5 CM2/M2
ECHO AV MEAN GRADIENT: 4 MMHG
ECHO AV MEAN VELOCITY: 1 M/S
ECHO AV PEAK GRADIENT: 7 MMHG
ECHO AV PEAK VELOCITY: 1.3 M/S
ECHO AV VELOCITY RATIO: 0.69
ECHO AV VTI: 14.7 CM
ECHO BSA: 1.92 M2
ECHO LA DIAMETER INDEX: 1.98 CM/M2
ECHO LA DIAMETER: 3.8 CM
ECHO LA VOL A-L A2C: 28 ML (ref 18–58)
ECHO LA VOL A-L A4C: 46 ML (ref 18–58)
ECHO LA VOL BP: 37 ML (ref 18–58)
ECHO LA VOL MOD A2C: 26 ML (ref 18–58)
ECHO LA VOL MOD A4C: 44 ML (ref 18–58)
ECHO LA VOL/BSA BIPLANE: 19 ML/M2 (ref 16–34)
ECHO LA VOLUME AREA LENGTH: 39 ML
ECHO LA VOLUME INDEX A-L A2C: 15 ML/M2 (ref 16–34)
ECHO LA VOLUME INDEX A-L A4C: 24 ML/M2 (ref 16–34)
ECHO LA VOLUME INDEX AREA LENGTH: 20 ML/M2 (ref 16–34)
ECHO LA VOLUME INDEX MOD A2C: 14 ML/M2 (ref 16–34)
ECHO LA VOLUME INDEX MOD A4C: 23 ML/M2 (ref 16–34)
ECHO LV E' LATERAL VELOCITY: 6 CM/S
ECHO LV E' SEPTAL VELOCITY: 6 CM/S
ECHO LV EF PHYSICIAN: 50 %
ECHO LV FRACTIONAL SHORTENING: 31 % (ref 28–44)
ECHO LV INTERNAL DIMENSION DIASTOLE INDEX: 2.66 CM/M2
ECHO LV INTERNAL DIMENSION DIASTOLIC: 5.1 CM (ref 4.2–5.9)
ECHO LV INTERNAL DIMENSION SYSTOLIC INDEX: 1.82 CM/M2
ECHO LV INTERNAL DIMENSION SYSTOLIC: 3.5 CM
ECHO LV IVSD: 1.1 CM (ref 0.6–1)
ECHO LV MASS 2D: 213.9 G (ref 88–224)
ECHO LV MASS INDEX 2D: 111.4 G/M2 (ref 49–115)
ECHO LV POSTERIOR WALL DIASTOLIC: 1.1 CM (ref 0.6–1)
ECHO LV RELATIVE WALL THICKNESS RATIO: 0.43
ECHO LVOT AREA: 3.5 CM2
ECHO LVOT AV VTI INDEX: 0.83
ECHO LVOT DIAM: 2.1 CM
ECHO LVOT MEAN GRADIENT: 2 MMHG
ECHO LVOT PEAK GRADIENT: 3 MMHG
ECHO LVOT PEAK VELOCITY: 0.9 M/S
ECHO LVOT STROKE VOLUME INDEX: 22 ML/M2
ECHO LVOT SV: 42.2 ML
ECHO LVOT VTI: 12.2 CM
ECHO MV A VELOCITY: 0.54 M/S
ECHO MV AREA PHT: 4 CM2
ECHO MV AREA VTI: 3.1 CM2
ECHO MV E DECELERATION TIME (DT): 241.3 MS
ECHO MV E VELOCITY: 0.4 M/S
ECHO MV E/A RATIO: 0.74
ECHO MV E/E' LATERAL: 6.67
ECHO MV E/E' RATIO (AVERAGED): 6.67
ECHO MV E/E' SEPTAL: 6.67
ECHO MV LVOT VTI INDEX: 1.12
ECHO MV MAX VELOCITY: 0.8 M/S
ECHO MV MEAN GRADIENT: 1 MMHG
ECHO MV MEAN VELOCITY: 0.4 M/S
ECHO MV PEAK GRADIENT: 2 MMHG
ECHO MV PRESSURE HALF TIME (PHT): 54.7 MS
ECHO MV VTI: 13.7 CM
ECHO RV BASAL DIMENSION: 3.5 CM
ECHO RV LONGITUDINAL DIMENSION: 7.4 CM
ECHO RV MID DIMENSION: 2.2 CM
ECHO RV TAPSE: 2.4 CM (ref 1.7–?)
EKG ATRIAL RATE: 120 BPM
EKG P AXIS: 62 DEGREES
EKG P-R INTERVAL: 170 MS
EKG Q-T INTERVAL: 316 MS
EKG QRS DURATION: 110 MS
EKG QTC CALCULATION (BAZETT): 446 MS
EKG R AXIS: 81 DEGREES
EKG T AXIS: 92 DEGREES
EKG VENTRICULAR RATE: 120 BPM
EOSINOPHIL # BLD: 0 K/UL (ref 0.05–0.5)
EOSINOPHILS RELATIVE PERCENT: 0 % (ref 0–6)
ERYTHROCYTE [DISTWIDTH] IN BLOOD BY AUTOMATED COUNT: 13.5 % (ref 11.5–15)
FIO2: 40 %
FLUAV RNA NPH QL NAA+NON-PROBE: NOT DETECTED
FLUBV RNA NPH QL NAA+NON-PROBE: NOT DETECTED
GFR, ESTIMATED: 82 ML/MIN/1.73M2
GLUCOSE SERPL-MCNC: 140 MG/DL (ref 74–99)
HADV DNA NPH QL NAA+NON-PROBE: NOT DETECTED
HCO3: 19.5 MMOL/L (ref 22–26)
HCOV 229E RNA NPH QL NAA+NON-PROBE: NOT DETECTED
HCOV HKU1 RNA NPH QL NAA+NON-PROBE: NOT DETECTED
HCOV NL63 RNA NPH QL NAA+NON-PROBE: NOT DETECTED
HCOV OC43 RNA NPH QL NAA+NON-PROBE: NOT DETECTED
HCT VFR BLD AUTO: 44.9 % (ref 37–54)
HGB BLD-MCNC: 15.9 G/DL (ref 12.5–16.5)
HHB: 1.9 % (ref 0–5)
HMPV RNA NPH QL NAA+NON-PROBE: NOT DETECTED
HPIV1 RNA NPH QL NAA+NON-PROBE: NOT DETECTED
HPIV2 RNA NPH QL NAA+NON-PROBE: NOT DETECTED
HPIV3 RNA NPH QL NAA+NON-PROBE: NOT DETECTED
HPIV4 RNA NPH QL NAA+NON-PROBE: NOT DETECTED
IMM GRANULOCYTES # BLD AUTO: 0.04 K/UL (ref 0–0.58)
IMM GRANULOCYTES NFR BLD: 0 % (ref 0–5)
INR PPP: 1.1
LAB: ABNORMAL
LACTATE BLDV-SCNC: 1.3 MMOL/L (ref 0.5–2.2)
LACTATE BLDV-SCNC: 1.5 MMOL/L (ref 0.5–2.2)
LYMPHOCYTES NFR BLD: 1.1 K/UL (ref 1.5–4)
LYMPHOCYTES RELATIVE PERCENT: 11 % (ref 20–42)
Lab: 503
M PNEUMO DNA NPH QL NAA+NON-PROBE: NOT DETECTED
MAGNESIUM SERPL-MCNC: 2 MG/DL (ref 1.6–2.6)
MCH RBC QN AUTO: 33.4 PG (ref 26–35)
MCHC RBC AUTO-ENTMCNC: 35.4 G/DL (ref 32–34.5)
MCV RBC AUTO: 94.3 FL (ref 80–99.9)
METHB: 0.6 % (ref 0–1.5)
MODE: AC
MONOCYTES NFR BLD: 0.63 K/UL (ref 0.1–0.95)
MONOCYTES NFR BLD: 6 % (ref 2–12)
NEUTROPHILS NFR BLD: 83 % (ref 43–80)
NEUTS SEG NFR BLD: 8.58 K/UL (ref 1.8–7.3)
O2 SATURATION: 98.1 % (ref 92–98.5)
O2HB: 96.9 % (ref 94–97)
OPERATOR ID: 2577
PARTIAL THROMBOPLASTIN TIME: 29.4 SEC (ref 24.5–35.1)
PATIENT TEMP: 37 C
PCO2: 28.7 MMHG (ref 35–45)
PEEP/CPAP: 8 CMH2O
PFO2: 2.5 MMHG/%
PH BLOOD GAS: 7.45 (ref 7.35–7.45)
PHOSPHATE SERPL-MCNC: 3.4 MG/DL (ref 2.5–4.5)
PLATELET # BLD AUTO: 216 K/UL (ref 130–450)
PMV BLD AUTO: 11.3 FL (ref 7–12)
PO2: 100 MMHG (ref 75–100)
POTASSIUM SERPL-SCNC: 4.4 MMOL/L (ref 3.5–5.1)
PROT SERPL-MCNC: 6.6 G/DL (ref 6.4–8.3)
PROTHROMBIN TIME: 12.3 SEC (ref 9.3–12.4)
RBC # BLD AUTO: 4.76 M/UL (ref 3.8–5.8)
RI(T): 1.52
RR MECHANICAL: 20 B/MIN
RSV RNA NPH QL NAA+NON-PROBE: NOT DETECTED
RV+EV RNA NPH QL NAA+NON-PROBE: NOT DETECTED
SARS-COV-2 RNA NPH QL NAA+NON-PROBE: NOT DETECTED
SODIUM SERPL-SCNC: 138 MMOL/L (ref 136–145)
SOURCE, BLOOD GAS: ABNORMAL
SPECIMEN DESCRIPTION: NORMAL
THB: 16.4 G/DL (ref 11.5–16.5)
TIME ANALYZED: 511
TROPONIN I SERPL HS-MCNC: 9968 NG/L (ref 0–22)
TROPONIN I SERPL HS-MCNC: ABNORMAL NG/L (ref 0–22)
VT MECHANICAL: 450 ML
WBC OTHER # BLD: 10.4 K/UL (ref 4.5–11.5)

## 2025-07-08 PROCEDURE — 93010 ELECTROCARDIOGRAM REPORT: CPT | Performed by: INTERNAL MEDICINE

## 2025-07-08 PROCEDURE — 99291 CRITICAL CARE FIRST HOUR: CPT | Performed by: INTERNAL MEDICINE

## 2025-07-08 PROCEDURE — 2580000003 HC RX 258: Performed by: INTERNAL MEDICINE

## 2025-07-08 PROCEDURE — 95819 EEG AWAKE AND ASLEEP: CPT

## 2025-07-08 PROCEDURE — 93306 TTE W/DOPPLER COMPLETE: CPT | Performed by: INTERNAL MEDICINE

## 2025-07-08 PROCEDURE — 2500000003 HC RX 250 WO HCPCS: Performed by: NURSE PRACTITIONER

## 2025-07-08 PROCEDURE — 93005 ELECTROCARDIOGRAM TRACING: CPT | Performed by: INTERNAL MEDICINE

## 2025-07-08 PROCEDURE — 80053 COMPREHEN METABOLIC PANEL: CPT

## 2025-07-08 PROCEDURE — 99232 SBSQ HOSP IP/OBS MODERATE 35: CPT | Performed by: INTERNAL MEDICINE

## 2025-07-08 PROCEDURE — 6360000002 HC RX W HCPCS: Performed by: INTERNAL MEDICINE

## 2025-07-08 PROCEDURE — 6360000002 HC RX W HCPCS: Performed by: NURSE PRACTITIONER

## 2025-07-08 PROCEDURE — 6370000000 HC RX 637 (ALT 250 FOR IP): Performed by: INTERNAL MEDICINE

## 2025-07-08 PROCEDURE — 93306 TTE W/DOPPLER COMPLETE: CPT

## 2025-07-08 PROCEDURE — 82805 BLOOD GASES W/O2 SATURATION: CPT

## 2025-07-08 PROCEDURE — 83880 ASSAY OF NATRIURETIC PEPTIDE: CPT

## 2025-07-08 PROCEDURE — 85025 COMPLETE CBC W/AUTO DIFF WBC: CPT

## 2025-07-08 PROCEDURE — 94003 VENT MGMT INPAT SUBQ DAY: CPT

## 2025-07-08 PROCEDURE — 2580000003 HC RX 258: Performed by: NURSE PRACTITIONER

## 2025-07-08 PROCEDURE — 6370000000 HC RX 637 (ALT 250 FOR IP): Performed by: NURSE PRACTITIONER

## 2025-07-08 PROCEDURE — 85730 THROMBOPLASTIN TIME PARTIAL: CPT

## 2025-07-08 PROCEDURE — 2000000000 HC ICU R&B

## 2025-07-08 PROCEDURE — 85610 PROTHROMBIN TIME: CPT

## 2025-07-08 PROCEDURE — 71045 X-RAY EXAM CHEST 1 VIEW: CPT

## 2025-07-08 PROCEDURE — 84484 ASSAY OF TROPONIN QUANT: CPT

## 2025-07-08 PROCEDURE — 83735 ASSAY OF MAGNESIUM: CPT

## 2025-07-08 PROCEDURE — 51798 US URINE CAPACITY MEASURE: CPT

## 2025-07-08 PROCEDURE — 83605 ASSAY OF LACTIC ACID: CPT

## 2025-07-08 PROCEDURE — 95822 EEG COMA OR SLEEP ONLY: CPT | Performed by: PSYCHIATRY & NEUROLOGY

## 2025-07-08 PROCEDURE — 94640 AIRWAY INHALATION TREATMENT: CPT

## 2025-07-08 PROCEDURE — 84100 ASSAY OF PHOSPHORUS: CPT

## 2025-07-08 RX ORDER — FUROSEMIDE 10 MG/ML
20 INJECTION INTRAMUSCULAR; INTRAVENOUS ONCE
Status: COMPLETED | OUTPATIENT
Start: 2025-07-08 | End: 2025-07-08

## 2025-07-08 RX ORDER — ENOXAPARIN SODIUM 100 MG/ML
40 INJECTION SUBCUTANEOUS DAILY
Status: DISCONTINUED | OUTPATIENT
Start: 2025-07-08 | End: 2025-07-16 | Stop reason: HOSPADM

## 2025-07-08 RX ORDER — SODIUM CHLORIDE 9 MG/ML
INJECTION, SOLUTION INTRAVENOUS CONTINUOUS
Status: DISCONTINUED | OUTPATIENT
Start: 2025-07-08 | End: 2025-07-16 | Stop reason: HOSPADM

## 2025-07-08 RX ADMIN — LEVETIRACETAM 500 MG: 100 INJECTION INTRAVENOUS at 09:17

## 2025-07-08 RX ADMIN — LEVETIRACETAM 500 MG: 100 INJECTION INTRAVENOUS at 20:23

## 2025-07-08 RX ADMIN — Medication 4 MG/HR: at 22:43

## 2025-07-08 RX ADMIN — MIDAZOLAM HYDROCHLORIDE 1 MG: 2 INJECTION, SOLUTION INTRAMUSCULAR; INTRAVENOUS at 23:56

## 2025-07-08 RX ADMIN — IPRATROPIUM BROMIDE AND ALBUTEROL SULFATE 1 DOSE: .5; 2.5 SOLUTION RESPIRATORY (INHALATION) at 09:06

## 2025-07-08 RX ADMIN — CARVEDILOL 3.12 MG: 6.25 TABLET, FILM COATED ORAL at 09:17

## 2025-07-08 RX ADMIN — ENOXAPARIN SODIUM 40 MG: 100 INJECTION SUBCUTANEOUS at 09:14

## 2025-07-08 RX ADMIN — FUROSEMIDE 20 MG: 10 INJECTION, SOLUTION INTRAMUSCULAR; INTRAVENOUS at 12:18

## 2025-07-08 RX ADMIN — IPRATROPIUM BROMIDE AND ALBUTEROL SULFATE 1 DOSE: .5; 2.5 SOLUTION RESPIRATORY (INHALATION) at 20:43

## 2025-07-08 RX ADMIN — CARVEDILOL 3.12 MG: 6.25 TABLET, FILM COATED ORAL at 17:09

## 2025-07-08 RX ADMIN — SODIUM CHLORIDE: 0.9 INJECTION, SOLUTION INTRAVENOUS at 23:17

## 2025-07-08 RX ADMIN — SODIUM CHLORIDE: 0.9 INJECTION, SOLUTION INTRAVENOUS at 06:33

## 2025-07-08 RX ADMIN — CHLORHEXIDINE GLUCONATE, 0.12% ORAL RINSE 15 ML: 1.2 SOLUTION DENTAL at 09:13

## 2025-07-08 RX ADMIN — PROPOFOL 25 MCG/KG/MIN: 10 INJECTION, EMULSION INTRAVENOUS at 05:09

## 2025-07-08 RX ADMIN — ROSUVASTATIN 40 MG: 20 TABLET, FILM COATED ORAL at 20:23

## 2025-07-08 RX ADMIN — IPRATROPIUM BROMIDE AND ALBUTEROL SULFATE 1 DOSE: .5; 2.5 SOLUTION RESPIRATORY (INHALATION) at 12:57

## 2025-07-08 RX ADMIN — SODIUM CHLORIDE: 0.9 INJECTION, SOLUTION INTRAVENOUS at 13:12

## 2025-07-08 RX ADMIN — TICAGRELOR 90 MG: 90 TABLET ORAL at 06:25

## 2025-07-08 RX ADMIN — CHLORHEXIDINE GLUCONATE, 0.12% ORAL RINSE 15 ML: 1.2 SOLUTION DENTAL at 20:23

## 2025-07-08 RX ADMIN — SODIUM CHLORIDE, PRESERVATIVE FREE 10 ML: 5 INJECTION INTRAVENOUS at 09:13

## 2025-07-08 RX ADMIN — SODIUM CHLORIDE, PRESERVATIVE FREE 10 ML: 5 INJECTION INTRAVENOUS at 20:30

## 2025-07-08 RX ADMIN — IPRATROPIUM BROMIDE AND ALBUTEROL SULFATE 1 DOSE: .5; 2.5 SOLUTION RESPIRATORY (INHALATION) at 06:38

## 2025-07-08 RX ADMIN — PROPOFOL 20 MCG/KG/MIN: 10 INJECTION, EMULSION INTRAVENOUS at 11:10

## 2025-07-08 RX ADMIN — TICAGRELOR 90 MG: 90 TABLET ORAL at 20:23

## 2025-07-08 RX ADMIN — IPRATROPIUM BROMIDE AND ALBUTEROL SULFATE 1 DOSE: .5; 2.5 SOLUTION RESPIRATORY (INHALATION) at 16:29

## 2025-07-08 RX ADMIN — SODIUM CHLORIDE, PRESERVATIVE FREE 40 MG: 5 INJECTION INTRAVENOUS at 09:17

## 2025-07-08 RX ADMIN — ACETAMINOPHEN 650 MG: 325 TABLET ORAL at 09:15

## 2025-07-08 RX ADMIN — ASPIRIN 81 MG CHEWABLE TABLET 81 MG: 81 TABLET CHEWABLE at 09:14

## 2025-07-08 ASSESSMENT — PULMONARY FUNCTION TESTS
PIF_VALUE: 39
PIF_VALUE: 21
PIF_VALUE: 22
PIF_VALUE: 23
PIF_VALUE: 20
PIF_VALUE: 22
PIF_VALUE: 25
PIF_VALUE: 23
PIF_VALUE: 21
PIF_VALUE: 21
PIF_VALUE: 28
PIF_VALUE: 22
PIF_VALUE: 21
PIF_VALUE: 22
PIF_VALUE: 24
PIF_VALUE: 21
PIF_VALUE: 23
PIF_VALUE: 25
PIF_VALUE: 20
PIF_VALUE: 20
PIF_VALUE: 22
PIF_VALUE: 21
PIF_VALUE: 19
PIF_VALUE: 22

## 2025-07-08 ASSESSMENT — PAIN SCALES - GENERAL
PAINLEVEL_OUTOF10: 0

## 2025-07-08 NOTE — PLAN OF CARE
Patient Is In Bilateral Wrist Restraints. When Released Pulls at lines and Tubes.  Problem: Safety - Adult  Goal: Free from fall injury  Outcome: Progressing     Problem: ABCDS Injury Assessment  Goal: Absence of physical injury  Outcome: Progressing     Problem: Pain  Goal: Verbalizes/displays adequate comfort level or baseline comfort level  Outcome: Progressing     Problem: Safety - Medical Restraint  Goal: Remains free of injury from restraints (Restraint for Interference with Medical Device)  Description: INTERVENTIONS:  1. Determine that other, less restrictive measures have been tried or would not be effective before applying the restraint  2. Evaluate the patient's condition at the time of restraint application  3. Inform patient/family regarding the reason for restraint  4. Q2H: Monitor safety, psychosocial status, comfort, nutrition and hydration  Outcome: Progressing

## 2025-07-08 NOTE — PLAN OF CARE
Problem: ABCDS Injury Assessment  Goal: Absence of physical injury  7/8/2025 1150 by Jes Bee, RN  Outcome: Progressing  Flowsheets (Taken 7/8/2025 1022)  Absence of Physical Injury: Implement safety measures based on patient assessment     Problem: Skin/Tissue Integrity  Goal: Skin integrity remains intact  Description: 1.  Monitor for areas of redness and/or skin breakdown  2.  Assess vascular access sites hourly  3.  Every 4-6 hours minimum:  Change oxygen saturation probe site  4.  Every 4-6 hours:  If on nasal continuous positive airway pressure, respiratory therapy assess nares and determine need for appliance change or resting period  Outcome: Progressing  Flowsheets (Taken 7/8/2025 0800)  Skin Integrity Remains Intact:   Monitor for areas of redness and/or skin breakdown   Assess vascular access sites hourly   Every 4-6 hours minimum:  Change oxygen saturation probe site   Every 4-6 hours:  If on nasal continuous positive airway pressure, assess nares and determine need for appliance change or resting period   Turn and reposition as indicated   Positioning devices   Monitor skin under medical devices   Check visual cues for pain     Problem: Neurosensory - Adult  Goal: Absence of seizures  Outcome: Progressing  Flowsheets (Taken 7/8/2025 0800)  Absence of seizures:   If seizure occurs, turn head to side and suction secretions as needed   Monitor for seizure activity.  If seizure occurs, document type and location of movements and any associated apnea   Support airway/breathing, administer oxygen as needed   Administer anticonvulsants as ordered   Diagnostic studies as ordered     Problem: Cardiovascular - Adult  Goal: Maintains optimal cardiac output and hemodynamic stability  Outcome: Progressing  Flowsheets (Taken 7/8/2025 0800)  Maintains optimal cardiac output and hemodynamic stability:   Monitor blood pressure and heart rate   Monitor urine output and notify Licensed Independent

## 2025-07-08 NOTE — H&P
Hospital Medicine History & Physical      PCP: Carolin León DO    Date of Admission: 7/7/2025    Date of Service: .JULY 8, 2025    Chief Complaint:  STEMI      HISTORY, HAD AN OUT OF HOSPITAL CARDIA ARREST.  HE IS SEDATED AND INTUBATED, NO FAMILY PRESENT.  FOUND TO HAVE AN INFERIOR STEMI.   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  Per EMS, patient was found to be in V-fib arrest, defibrillated once and ROSC was achieved.  Patient's pupils were apparently pinpoint in the field and received Narcan for this.    Per wife, patient has a history of seizures otherwise takes no other medications, is a smoker.     Past Medical History:      No past medical history on file.    Past Surgical History:          Procedure Laterality Date    CARDIAC PROCEDURE N/A 7/7/2025    Left heart cath / coronary angiography performed by Bryce Canchola DO at [facility] [place] [facility]    CARDIAC PROCEDURE N/A 7/7/2025    Percutaneous coronary intervention performed by Bryce Canchola DO at [facility] [place]ATH [facility]    CARDIAC PROCEDURE N/A 7/7/2025    Insert stent carmen coronary performed by Bryce Canchola DO at [facility]       Medications Prior to Admission:      Prior to Admission medications    Medication Sig Start Date End Date Taking? Authorizing Provider   tiZANidine (ZANAFLEX) 4 MG tablet Take 1 tablet by mouth every 6 hours as needed (muscle spasms)  Patient not taking: Reported on 7/7/2025 6/22/18   Carolyn Frazier PA       Allergies:  Patient has no known allergies.    Social History:      The patient currently lives     TOBACCO:   reports that he has been smoking cigarettes. He has never used smokeless tobacco.  ETOH:   reports current alcohol use.      Family History:       Reviewed in detail

## 2025-07-08 NOTE — FLOWSHEET NOTE
Patient attempting to reach for lines and tubes when unrestrained     07/08/25 1007   Restraint Order   Length of Order (Only Document With Each New Order) Continuous Until 2359 of Next Calendar Day   Attending Physician Notified (Only Document With Each New Order) Yes   Order Upon Application (Only Document With Each New Order) Yes   NV Length of Order 37.88   Restraint Type   Non-Violent Restraint Type from Order question Soft Restraint Bilateral Wrist   Soft Restraint Bilateral Wrist CONTINUED   Assessment   Less Restrictive Alternative 1:1 patient care;Repositioning;Re-evaluate equipment;Disguise equipment;Re-orientation;Verbal re-direction   Special Consideration/Risk Factors None   Justification from Order   Clinical Justification Pulling lines tubes dressing equipment   Education   Discontinuation Criteria Absence of behavior that required restraint   Criteria Explained Yes   Patient's Response No evidence of learning   Family Notification Already completed   Restraint  Monitoring Q2 Hours   Continued Justification  Continues to meet order criteria   Visual/Safety Check  Sedated;Subdued   Circulation No signs of injury   Range of Motion Performed   Fluids NPO   Food/Meal NPO   Elimination No   Reason Patient did not Eliminate Urinary catheter   Care Plan Interventions   Remains free of injury from restraints (restraint for interference with medical device) Determine that other, less restrictive measures have been tried or would not be effective before applying the restraint;Evaluate the patient's condition at the time of restraint application;Inform patient/family regarding the reason for restraint;Every 2 hours: Monitor safety, psychosocial status, comfort, nutrition and hydration

## 2025-07-09 ENCOUNTER — APPOINTMENT (OUTPATIENT)
Dept: GENERAL RADIOLOGY | Age: 55
DRG: 321 | End: 2025-07-09
Payer: COMMERCIAL

## 2025-07-09 PROBLEM — G25.3 MYOCLONUS: Status: ACTIVE | Noted: 2025-07-09

## 2025-07-09 PROBLEM — G93.1 ANOXIC BRAIN INJURY (HCC): Status: ACTIVE | Noted: 2025-07-09

## 2025-07-09 PROBLEM — I21.9 ACUTE MYOCARDIAL INFARCTION (HCC): Status: ACTIVE | Noted: 2025-07-07

## 2025-07-09 LAB
AADO2: 158.3 MMHG
ALBUMIN SERPL-MCNC: 3.5 G/DL (ref 3.5–5.2)
ALP SERPL-CCNC: 60 U/L (ref 40–129)
ALT SERPL-CCNC: 124 U/L (ref 0–50)
ANION GAP SERPL CALCULATED.3IONS-SCNC: 11 MMOL/L (ref 7–16)
AST SERPL-CCNC: 238 U/L (ref 0–50)
B.E.: -4.2 MMOL/L (ref -3–3)
BASOPHILS # BLD: 0.02 K/UL (ref 0–0.2)
BASOPHILS NFR BLD: 0 % (ref 0–2)
BILIRUB SERPL-MCNC: 0.6 MG/DL (ref 0–1.2)
BUN SERPL-MCNC: 13 MG/DL (ref 6–20)
CALCIUM SERPL-MCNC: 8.6 MG/DL (ref 8.6–10)
CHLORIDE SERPL-SCNC: 106 MMOL/L (ref 98–107)
CO2 SERPL-SCNC: 19 MMOL/L (ref 22–29)
COHB: 0.6 % (ref 0–1.5)
CREAT SERPL-MCNC: 0.8 MG/DL (ref 0.7–1.2)
CRITICAL: ABNORMAL
DATE ANALYZED: ABNORMAL
DATE OF COLLECTION: ABNORMAL
EOSINOPHIL # BLD: 0 K/UL (ref 0.05–0.5)
EOSINOPHILS RELATIVE PERCENT: 0 % (ref 0–6)
ERYTHROCYTE [DISTWIDTH] IN BLOOD BY AUTOMATED COUNT: 14 % (ref 11.5–15)
FIO2: 40 %
GFR, ESTIMATED: >90 ML/MIN/1.73M2
GLUCOSE SERPL-MCNC: 129 MG/DL (ref 74–99)
HCO3: 18.5 MMOL/L (ref 22–26)
HCT VFR BLD AUTO: 41.2 % (ref 37–54)
HGB BLD-MCNC: 14.1 G/DL (ref 12.5–16.5)
HHB: 2.3 % (ref 0–5)
IMM GRANULOCYTES # BLD AUTO: 0.09 K/UL (ref 0–0.58)
IMM GRANULOCYTES NFR BLD: 1 % (ref 0–5)
INR PPP: 1.2
LAB: ABNORMAL
LYMPHOCYTES NFR BLD: 0.87 K/UL (ref 1.5–4)
LYMPHOCYTES RELATIVE PERCENT: 6 % (ref 20–42)
Lab: 434
MAGNESIUM SERPL-MCNC: 2 MG/DL (ref 1.6–2.6)
MCH RBC QN AUTO: 33 PG (ref 26–35)
MCHC RBC AUTO-ENTMCNC: 34.2 G/DL (ref 32–34.5)
MCV RBC AUTO: 96.5 FL (ref 80–99.9)
METHB: 0.5 % (ref 0–1.5)
MICROORGANISM SPEC CULT: NORMAL
MODE: AC
MONOCYTES NFR BLD: 0.93 K/UL (ref 0.1–0.95)
MONOCYTES NFR BLD: 7 % (ref 2–12)
NEUTROPHILS NFR BLD: 87 % (ref 43–80)
NEUTS SEG NFR BLD: 12.18 K/UL (ref 1.8–7.3)
O2 SATURATION: 97.7 % (ref 92–98.5)
O2HB: 96.6 % (ref 94–97)
OPERATOR ID: 2593
PARTIAL THROMBOPLASTIN TIME: 28.7 SEC (ref 24.5–35.1)
PATIENT TEMP: 37 C
PCO2: 28.4 MMHG (ref 35–45)
PEEP/CPAP: 8 CMH2O
PFO2: 2.36 MMHG/%
PH BLOOD GAS: 7.43 (ref 7.35–7.45)
PHOSPHATE SERPL-MCNC: 2.3 MG/DL (ref 2.5–4.5)
PLATELET # BLD AUTO: 180 K/UL (ref 130–450)
PMV BLD AUTO: 11.6 FL (ref 7–12)
PO2: 94.2 MMHG (ref 75–100)
POTASSIUM SERPL-SCNC: 4.1 MMOL/L (ref 3.5–5.1)
PROT SERPL-MCNC: 6 G/DL (ref 6.4–8.3)
PROTHROMBIN TIME: 12.6 SEC (ref 9.3–12.4)
RBC # BLD AUTO: 4.27 M/UL (ref 3.8–5.8)
RI(T): 1.68
RR MECHANICAL: 20 B/MIN
SODIUM SERPL-SCNC: 136 MMOL/L (ref 136–145)
SOURCE, BLOOD GAS: ABNORMAL
SPECIMEN DESCRIPTION: NORMAL
THB: 14.9 G/DL (ref 11.5–16.5)
TIME ANALYZED: 437
VT MECHANICAL: 450 ML
WBC OTHER # BLD: 14.1 K/UL (ref 4.5–11.5)

## 2025-07-09 PROCEDURE — 6360000002 HC RX W HCPCS: Performed by: NURSE PRACTITIONER

## 2025-07-09 PROCEDURE — 80053 COMPREHEN METABOLIC PANEL: CPT

## 2025-07-09 PROCEDURE — 6370000000 HC RX 637 (ALT 250 FOR IP): Performed by: NURSE PRACTITIONER

## 2025-07-09 PROCEDURE — 51798 US URINE CAPACITY MEASURE: CPT

## 2025-07-09 PROCEDURE — 85610 PROTHROMBIN TIME: CPT

## 2025-07-09 PROCEDURE — 94003 VENT MGMT INPAT SUBQ DAY: CPT

## 2025-07-09 PROCEDURE — 6370000000 HC RX 637 (ALT 250 FOR IP): Performed by: INTERNAL MEDICINE

## 2025-07-09 PROCEDURE — 2000000000 HC ICU R&B

## 2025-07-09 PROCEDURE — 85730 THROMBOPLASTIN TIME PARTIAL: CPT

## 2025-07-09 PROCEDURE — 94640 AIRWAY INHALATION TREATMENT: CPT

## 2025-07-09 PROCEDURE — 99232 SBSQ HOSP IP/OBS MODERATE 35: CPT

## 2025-07-09 PROCEDURE — 2700000000 HC OXYGEN THERAPY PER DAY

## 2025-07-09 PROCEDURE — 83735 ASSAY OF MAGNESIUM: CPT

## 2025-07-09 PROCEDURE — 6360000002 HC RX W HCPCS: Performed by: INTERNAL MEDICINE

## 2025-07-09 PROCEDURE — 2500000003 HC RX 250 WO HCPCS: Performed by: NURSE PRACTITIONER

## 2025-07-09 PROCEDURE — 99233 SBSQ HOSP IP/OBS HIGH 50: CPT | Performed by: INTERNAL MEDICINE

## 2025-07-09 PROCEDURE — 84100 ASSAY OF PHOSPHORUS: CPT

## 2025-07-09 PROCEDURE — 93005 ELECTROCARDIOGRAM TRACING: CPT | Performed by: INTERNAL MEDICINE

## 2025-07-09 PROCEDURE — 82805 BLOOD GASES W/O2 SATURATION: CPT

## 2025-07-09 PROCEDURE — 71045 X-RAY EXAM CHEST 1 VIEW: CPT

## 2025-07-09 PROCEDURE — 85025 COMPLETE CBC W/AUTO DIFF WBC: CPT

## 2025-07-09 PROCEDURE — 99291 CRITICAL CARE FIRST HOUR: CPT | Performed by: INTERNAL MEDICINE

## 2025-07-09 RX ORDER — CARVEDILOL 6.25 MG/1
6.25 TABLET ORAL 2 TIMES DAILY WITH MEALS
Status: DISCONTINUED | OUTPATIENT
Start: 2025-07-09 | End: 2025-07-10

## 2025-07-09 RX ADMIN — ASPIRIN 81 MG CHEWABLE TABLET 81 MG: 81 TABLET CHEWABLE at 09:01

## 2025-07-09 RX ADMIN — IPRATROPIUM BROMIDE AND ALBUTEROL SULFATE 1 DOSE: .5; 2.5 SOLUTION RESPIRATORY (INHALATION) at 03:35

## 2025-07-09 RX ADMIN — CHLORHEXIDINE GLUCONATE, 0.12% ORAL RINSE 15 ML: 1.2 SOLUTION DENTAL at 09:01

## 2025-07-09 RX ADMIN — SODIUM CHLORIDE, PRESERVATIVE FREE 10 ML: 5 INJECTION INTRAVENOUS at 09:02

## 2025-07-09 RX ADMIN — SODIUM CHLORIDE, PRESERVATIVE FREE 40 MG: 5 INJECTION INTRAVENOUS at 09:01

## 2025-07-09 RX ADMIN — ROSUVASTATIN 40 MG: 20 TABLET, FILM COATED ORAL at 20:42

## 2025-07-09 RX ADMIN — ACETAMINOPHEN 650 MG: 325 TABLET ORAL at 23:13

## 2025-07-09 RX ADMIN — CARVEDILOL 6.25 MG: 6.25 TABLET, FILM COATED ORAL at 17:02

## 2025-07-09 RX ADMIN — SODIUM CHLORIDE, PRESERVATIVE FREE 10 ML: 5 INJECTION INTRAVENOUS at 20:42

## 2025-07-09 RX ADMIN — PROPOFOL 20 MCG/KG/MIN: 10 INJECTION, EMULSION INTRAVENOUS at 09:34

## 2025-07-09 RX ADMIN — PROPOFOL 15 MCG/KG/MIN: 10 INJECTION, EMULSION INTRAVENOUS at 01:10

## 2025-07-09 RX ADMIN — CARVEDILOL 6.25 MG: 6.25 TABLET, FILM COATED ORAL at 09:01

## 2025-07-09 RX ADMIN — LEVETIRACETAM 500 MG: 100 INJECTION INTRAVENOUS at 09:01

## 2025-07-09 RX ADMIN — LOSARTAN POTASSIUM 25 MG: 25 TABLET, FILM COATED ORAL at 09:05

## 2025-07-09 RX ADMIN — IPRATROPIUM BROMIDE AND ALBUTEROL SULFATE 1 DOSE: .5; 2.5 SOLUTION RESPIRATORY (INHALATION) at 20:47

## 2025-07-09 RX ADMIN — ENOXAPARIN SODIUM 40 MG: 100 INJECTION SUBCUTANEOUS at 09:01

## 2025-07-09 RX ADMIN — IPRATROPIUM BROMIDE AND ALBUTEROL SULFATE 1 DOSE: .5; 2.5 SOLUTION RESPIRATORY (INHALATION) at 07:49

## 2025-07-09 RX ADMIN — IPRATROPIUM BROMIDE AND ALBUTEROL SULFATE 1 DOSE: .5; 2.5 SOLUTION RESPIRATORY (INHALATION) at 12:17

## 2025-07-09 RX ADMIN — LEVETIRACETAM 500 MG: 100 INJECTION INTRAVENOUS at 20:42

## 2025-07-09 RX ADMIN — IPRATROPIUM BROMIDE AND ALBUTEROL SULFATE 1 DOSE: .5; 2.5 SOLUTION RESPIRATORY (INHALATION) at 00:20

## 2025-07-09 RX ADMIN — TICAGRELOR 90 MG: 90 TABLET ORAL at 09:01

## 2025-07-09 RX ADMIN — IPRATROPIUM BROMIDE AND ALBUTEROL SULFATE 1 DOSE: .5; 2.5 SOLUTION RESPIRATORY (INHALATION) at 15:24

## 2025-07-09 RX ADMIN — TICAGRELOR 90 MG: 90 TABLET ORAL at 20:42

## 2025-07-09 ASSESSMENT — PULMONARY FUNCTION TESTS
PIF_VALUE: 21
PIF_VALUE: 22
PIF_VALUE: 23
PIF_VALUE: 24
PIF_VALUE: 23
PIF_VALUE: 23
PIF_VALUE: 24
PIF_VALUE: 23
PIF_VALUE: 20
PIF_VALUE: 31
PIF_VALUE: 29

## 2025-07-09 ASSESSMENT — PAIN DESCRIPTION - DESCRIPTORS: DESCRIPTORS: ACHING;DISCOMFORT;SORE

## 2025-07-09 ASSESSMENT — PAIN SCALES - GENERAL
PAINLEVEL_OUTOF10: 0
PAINLEVEL_OUTOF10: 5
PAINLEVEL_OUTOF10: 0

## 2025-07-09 ASSESSMENT — PAIN DESCRIPTION - LOCATION: LOCATION: BACK

## 2025-07-09 ASSESSMENT — PAIN - FUNCTIONAL ASSESSMENT: PAIN_FUNCTIONAL_ASSESSMENT: ACTIVITIES ARE NOT PREVENTED

## 2025-07-09 ASSESSMENT — PAIN DESCRIPTION - ORIENTATION: ORIENTATION: RIGHT;LEFT;MID

## 2025-07-09 NOTE — PROCEDURES
Cuff leak preformed yes.      Patient was extubated to 3 liters/min via nasal cannula. Breath Sounds post extubation were equal. Stridor was not present post extubation. SPO2 was 98%.      Performed by  JACQUES StokesPPROCEDURE NOTE  Date: 7/9/2025   Name: Tadeo Santana  YOB: 1970    Procedures patient extubated per order

## 2025-07-09 NOTE — PLAN OF CARE
Problem: Safety - Medical Restraint  Goal: Remains free of injury from restraints (Restraint for Interference with Medical Device)  Description: INTERVENTIONS:  1. Determine that other, less restrictive measures have been tried or would not be effective before applying the restraint  2. Evaluate the patient's condition at the time of restraint application  3. Inform patient/family regarding the reason for restraint  4. Q2H: Monitor safety, psychosocial status, comfort, nutrition and hydration  7/9/2025 1238 by Yajaira Fine RN  Outcome: Completed  Flowsheets  Taken 7/9/2025 1009  Remains free of injury from restraints (restraint for interference with medical device): Every 2 hours: Monitor safety, psychosocial status, comfort, nutrition and hydration  Taken 7/9/2025 1000  Remains free of injury from restraints (restraint for interference with medical device): Every 2 hours: Monitor safety, psychosocial status, comfort, nutrition and hydration  7/9/2025 0925 by Yajaira Fine RN  Outcome: Progressing  Flowsheets  Taken 7/9/2025 0800 by Yajaira Fine RN  Remains free of injury from restraints (restraint for interference with medical device): Every 2 hours: Monitor safety, psychosocial status, comfort, nutrition and hydration  Taken 7/9/2025 0600 by Mari Pennington RN  Remains free of injury from restraints (restraint for interference with medical device): Every 2 hours: Monitor safety, psychosocial status, comfort, nutrition and hydration  Taken 7/9/2025 0400 by Mari Pennington RN  Remains free of injury from restraints (restraint for interference with medical device): Every 2 hours: Monitor safety, psychosocial status, comfort, nutrition and hydration  Taken 7/9/2025 0200 by Mari Pennington RN  Remains free of injury from restraints (restraint for interference with medical device): Every 2 hours: Monitor safety, psychosocial status, comfort, nutrition and hydration  Taken 7/9/2025 0000 by Mari Pennington

## 2025-07-09 NOTE — PLAN OF CARE
Patient pulls at lines/ tubes while being unrestrained, unable to redirect at this time    Problem: Safety - Adult  Goal: Free from fall injury  7/8/2025 2050 by Mari Pennington RN  Outcome: Progressing     Problem: ABCDS Injury Assessment  Goal: Absence of physical injury  7/8/2025 2050 by Mari Pennington RN  Outcome: Progressing     Problem: Safety - Medical Restraint  Goal: Remains free of injury from restraints (Restraint for Interference with Medical Device)  Description: INTERVENTIONS:  1. Determine that other, less restrictive measures have been tried or would not be effective before applying the restraint  2. Evaluate the patient's condition at the time of restraint application  3. Inform patient/family regarding the reason for restraint  4. Q2H: Monitor safety, psychosocial status, comfort, nutrition and hydration  7/8/2025 2050 by Mari Pennington RN  Outcome: Progressing  Flowsheets  Taken 7/8/2025 2000 by Mari Pennington RN  Remains free of injury from restraints (restraint for interference with medical device): Every 2 hours: Monitor safety, psychosocial status, comfort, nutrition and hydration  Taken 7/8/2025 1800 by Jes Bee RN  Remains free of injury from restraints (restraint for interference with medical device):   Determine that other, less restrictive measures have been tried or would not be effective before applying the restraint   Evaluate the patient's condition at the time of restraint application   Inform patient/family regarding the reason for restraint   Every 2 hours: Monitor safety, psychosocial status, comfort, nutrition and hydration  Taken 7/8/2025 1600 by Jes Bee RN  Remains free of injury from restraints (restraint for interference with medical device):   Determine that other, less restrictive measures have been tried or would not be effective before applying the restraint   Evaluate the patient's condition at the time of restraint

## 2025-07-09 NOTE — PLAN OF CARE
Problem: Discharge Planning  Goal: Discharge to home or other facility with appropriate resources  Outcome: Not Progressing     Problem: Pain  Goal: Verbalizes/displays adequate comfort level or baseline comfort level  Outcome: Not Progressing     Problem: Respiratory - Adult  Goal: Achieves optimal ventilation and oxygenation  7/9/2025 0925 by Yajaira Fine RN  Outcome: Not Progressing  7/8/2025 2050 by Mari Pennington RN  Outcome: Progressing     Problem: Neurosensory - Adult  Goal: Achieves maximal functionality and self care  Outcome: Not Progressing     Problem: Gastrointestinal - Adult  Goal: Maintains adequate nutritional intake  Outcome: Not Progressing     Problem: Discharge Planning  Goal: Discharge to home or other facility with appropriate resources  Outcome: Not Progressing     Problem: Pain  Goal: Verbalizes/displays adequate comfort level or baseline comfort level  Outcome: Not Progressing     Problem: Respiratory - Adult  Goal: Achieves optimal ventilation and oxygenation  7/9/2025 0925 by Yajaira Fine RN  Outcome: Not Progressing  7/8/2025 2050 by Mari Pennington RN  Outcome: Progressing     Problem: Neurosensory - Adult  Goal: Achieves maximal functionality and self care  Outcome: Not Progressing     Problem: Gastrointestinal - Adult  Goal: Maintains adequate nutritional intake  Outcome: Not Progressing

## 2025-07-09 NOTE — PROCEDURES
PROCEDURE NOTE  Date: 7/8/2025   Name: Tadeo Santana  YOB: 1970    Procedures:    Routine bedside EEG with video:      Method:   This recording was done using a digital EEG machine with 16 channels of EEG recording and 1 channel of EKG recording. Photic stimulation is not performed as an activation procedure. The tracing captures periods of unresponsiveness     Interpretation:   There is a posterior dominant rhythm of 5-6 Hz theta activity which does not attenuate throughout the recording. There is no evidence of fronto central Beta activity seen in this recording. There are no epileptiform abnormalities or electrographic seizures in this recording.         EKG demonstrates regular rhythm at 84 bpm.     Summary: This is an abnormal electroencephalogram due to the presence of generalized background slowing-is nonspecific but can be secondary to hypoxic ischemic encephalopathy, metabolic toxic encephalopathy, medication effect, or delirium  There are no epileptiform abnormality or electrographic seizures in this recording      Jasmine Nielsen MD

## 2025-07-09 NOTE — CARE COORDINATION
Care Coordination:LOS  2 day:  out of hospital cardiac arrest- bystander CPR, EMS found Vt/Vfib, defibrillated. Intubated in ER. Cardiology consulted for Inferior STEMI. PCI/AMGAN RCA- Mid and distal lesion 7/7/25. Admitted MICU, intubated, propofol, Versed, IVF, Brillinta. Neurology following for acute anoxic encephalopathy. Needs unclear, will follow    Electronically signed by Ca Sanchez RN on 7/9/2025 at 12:11 PM

## 2025-07-10 ENCOUNTER — APPOINTMENT (OUTPATIENT)
Dept: GENERAL RADIOLOGY | Age: 55
DRG: 321 | End: 2025-07-10
Payer: COMMERCIAL

## 2025-07-10 LAB
ALBUMIN SERPL-MCNC: 3.4 G/DL (ref 3.5–5.2)
ALP SERPL-CCNC: 61 U/L (ref 40–129)
ALT SERPL-CCNC: 93 U/L (ref 0–50)
ANION GAP SERPL CALCULATED.3IONS-SCNC: 11 MMOL/L (ref 7–16)
AST SERPL-CCNC: 156 U/L (ref 0–50)
BASOPHILS # BLD: 0.03 K/UL (ref 0–0.2)
BASOPHILS NFR BLD: 0 % (ref 0–2)
BILIRUB SERPL-MCNC: 0.6 MG/DL (ref 0–1.2)
BUN SERPL-MCNC: 12 MG/DL (ref 6–20)
CALCIUM SERPL-MCNC: 9 MG/DL (ref 8.6–10)
CHLORIDE SERPL-SCNC: 103 MMOL/L (ref 98–107)
CO2 SERPL-SCNC: 21 MMOL/L (ref 22–29)
CREAT SERPL-MCNC: 0.7 MG/DL (ref 0.7–1.2)
EKG ATRIAL RATE: 78 BPM
EKG ATRIAL RATE: 92 BPM
EKG P AXIS: 73 DEGREES
EKG P AXIS: 77 DEGREES
EKG P-R INTERVAL: 136 MS
EKG P-R INTERVAL: 150 MS
EKG Q-T INTERVAL: 352 MS
EKG Q-T INTERVAL: 408 MS
EKG QRS DURATION: 76 MS
EKG QRS DURATION: 86 MS
EKG QTC CALCULATION (BAZETT): 435 MS
EKG QTC CALCULATION (BAZETT): 465 MS
EKG R AXIS: 57 DEGREES
EKG R AXIS: 60 DEGREES
EKG T AXIS: -46 DEGREES
EKG T AXIS: 78 DEGREES
EKG VENTRICULAR RATE: 78 BPM
EKG VENTRICULAR RATE: 92 BPM
EOSINOPHIL # BLD: 0.06 K/UL (ref 0.05–0.5)
EOSINOPHILS RELATIVE PERCENT: 1 % (ref 0–6)
ERYTHROCYTE [DISTWIDTH] IN BLOOD BY AUTOMATED COUNT: 13.2 % (ref 11.5–15)
GFR, ESTIMATED: >90 ML/MIN/1.73M2
GLUCOSE SERPL-MCNC: 105 MG/DL (ref 74–99)
HCT VFR BLD AUTO: 39.8 % (ref 37–54)
HGB BLD-MCNC: 13.7 G/DL (ref 12.5–16.5)
IMM GRANULOCYTES # BLD AUTO: 0.06 K/UL (ref 0–0.58)
IMM GRANULOCYTES NFR BLD: 1 % (ref 0–5)
INR PPP: 1.1
LYMPHOCYTES NFR BLD: 1.45 K/UL (ref 1.5–4)
LYMPHOCYTES RELATIVE PERCENT: 14 % (ref 20–42)
MAGNESIUM SERPL-MCNC: 2.2 MG/DL (ref 1.6–2.6)
MCH RBC QN AUTO: 32.9 PG (ref 26–35)
MCHC RBC AUTO-ENTMCNC: 34.4 G/DL (ref 32–34.5)
MCV RBC AUTO: 95.7 FL (ref 80–99.9)
MONOCYTES NFR BLD: 0.67 K/UL (ref 0.1–0.95)
MONOCYTES NFR BLD: 6 % (ref 2–12)
NEUTROPHILS NFR BLD: 79 % (ref 43–80)
NEUTS SEG NFR BLD: 8.35 K/UL (ref 1.8–7.3)
PARTIAL THROMBOPLASTIN TIME: 28.3 SEC (ref 24.5–35.1)
PHOSPHATE SERPL-MCNC: 2.6 MG/DL (ref 2.5–4.5)
PLATELET # BLD AUTO: 173 K/UL (ref 130–450)
PMV BLD AUTO: 11.8 FL (ref 7–12)
POTASSIUM SERPL-SCNC: 4.8 MMOL/L (ref 3.5–5.1)
PROT SERPL-MCNC: 6.6 G/DL (ref 6.4–8.3)
PROTHROMBIN TIME: 12.6 SEC (ref 9.3–12.4)
RBC # BLD AUTO: 4.16 M/UL (ref 3.8–5.8)
SODIUM SERPL-SCNC: 135 MMOL/L (ref 136–145)
WBC OTHER # BLD: 10.6 K/UL (ref 4.5–11.5)

## 2025-07-10 PROCEDURE — 85610 PROTHROMBIN TIME: CPT

## 2025-07-10 PROCEDURE — 93010 ELECTROCARDIOGRAM REPORT: CPT | Performed by: INTERNAL MEDICINE

## 2025-07-10 PROCEDURE — 6360000002 HC RX W HCPCS: Performed by: NURSE PRACTITIONER

## 2025-07-10 PROCEDURE — 83735 ASSAY OF MAGNESIUM: CPT

## 2025-07-10 PROCEDURE — 6370000000 HC RX 637 (ALT 250 FOR IP): Performed by: NURSE PRACTITIONER

## 2025-07-10 PROCEDURE — 6370000000 HC RX 637 (ALT 250 FOR IP): Performed by: INTERNAL MEDICINE

## 2025-07-10 PROCEDURE — 2500000003 HC RX 250 WO HCPCS: Performed by: NURSE PRACTITIONER

## 2025-07-10 PROCEDURE — 85025 COMPLETE CBC W/AUTO DIFF WBC: CPT

## 2025-07-10 PROCEDURE — 85730 THROMBOPLASTIN TIME PARTIAL: CPT

## 2025-07-10 PROCEDURE — 99233 SBSQ HOSP IP/OBS HIGH 50: CPT | Performed by: INTERNAL MEDICINE

## 2025-07-10 PROCEDURE — 97530 THERAPEUTIC ACTIVITIES: CPT

## 2025-07-10 PROCEDURE — 93005 ELECTROCARDIOGRAM TRACING: CPT | Performed by: INTERNAL MEDICINE

## 2025-07-10 PROCEDURE — 92523 SPEECH SOUND LANG COMPREHEN: CPT

## 2025-07-10 PROCEDURE — 84100 ASSAY OF PHOSPHORUS: CPT

## 2025-07-10 PROCEDURE — 99232 SBSQ HOSP IP/OBS MODERATE 35: CPT

## 2025-07-10 PROCEDURE — 2060000000 HC ICU INTERMEDIATE R&B

## 2025-07-10 PROCEDURE — 6360000002 HC RX W HCPCS: Performed by: INTERNAL MEDICINE

## 2025-07-10 PROCEDURE — 97162 PT EVAL MOD COMPLEX 30 MIN: CPT

## 2025-07-10 PROCEDURE — 94640 AIRWAY INHALATION TREATMENT: CPT

## 2025-07-10 PROCEDURE — 80053 COMPREHEN METABOLIC PANEL: CPT

## 2025-07-10 PROCEDURE — 71045 X-RAY EXAM CHEST 1 VIEW: CPT

## 2025-07-10 PROCEDURE — 99291 CRITICAL CARE FIRST HOUR: CPT | Performed by: INTERNAL MEDICINE

## 2025-07-10 PROCEDURE — 2700000000 HC OXYGEN THERAPY PER DAY

## 2025-07-10 PROCEDURE — 97535 SELF CARE MNGMENT TRAINING: CPT

## 2025-07-10 PROCEDURE — 97166 OT EVAL MOD COMPLEX 45 MIN: CPT

## 2025-07-10 RX ORDER — MECOBALAMIN 5000 MCG
5 TABLET,DISINTEGRATING ORAL ONCE
Status: COMPLETED | OUTPATIENT
Start: 2025-07-10 | End: 2025-07-10

## 2025-07-10 RX ORDER — MECOBALAMIN 5000 MCG
10 TABLET,DISINTEGRATING ORAL NIGHTLY
Status: DISCONTINUED | OUTPATIENT
Start: 2025-07-10 | End: 2025-07-16 | Stop reason: HOSPADM

## 2025-07-10 RX ORDER — MIDAZOLAM HYDROCHLORIDE 2 MG/2ML
1 INJECTION, SOLUTION INTRAMUSCULAR; INTRAVENOUS ONCE
Status: COMPLETED | OUTPATIENT
Start: 2025-07-10 | End: 2025-07-10

## 2025-07-10 RX ORDER — QUETIAPINE FUMARATE 25 MG/1
25 TABLET, FILM COATED ORAL NIGHTLY
Status: DISCONTINUED | OUTPATIENT
Start: 2025-07-10 | End: 2025-07-11

## 2025-07-10 RX ORDER — CARVEDILOL 6.25 MG/1
12.5 TABLET ORAL 2 TIMES DAILY WITH MEALS
Status: DISCONTINUED | OUTPATIENT
Start: 2025-07-10 | End: 2025-07-11

## 2025-07-10 RX ORDER — PHENOBARBITAL SODIUM 65 MG/ML
65 INJECTION, SOLUTION INTRAMUSCULAR; INTRAVENOUS EVERY 8 HOURS SCHEDULED
Status: DISCONTINUED | OUTPATIENT
Start: 2025-07-10 | End: 2025-07-11

## 2025-07-10 RX ORDER — LORAZEPAM 2 MG/ML
0.5 INJECTION INTRAMUSCULAR ONCE
Status: DISCONTINUED | OUTPATIENT
Start: 2025-07-10 | End: 2025-07-10

## 2025-07-10 RX ADMIN — CARVEDILOL 12.5 MG: 6.25 TABLET, FILM COATED ORAL at 16:12

## 2025-07-10 RX ADMIN — ENOXAPARIN SODIUM 40 MG: 100 INJECTION SUBCUTANEOUS at 07:48

## 2025-07-10 RX ADMIN — TICAGRELOR 90 MG: 90 TABLET ORAL at 20:58

## 2025-07-10 RX ADMIN — ROSUVASTATIN 40 MG: 20 TABLET, FILM COATED ORAL at 20:58

## 2025-07-10 RX ADMIN — QUETIAPINE FUMARATE 25 MG: 25 TABLET ORAL at 20:58

## 2025-07-10 RX ADMIN — SODIUM CHLORIDE, PRESERVATIVE FREE 10 ML: 5 INJECTION INTRAVENOUS at 07:48

## 2025-07-10 RX ADMIN — Medication 5 MG: at 02:51

## 2025-07-10 RX ADMIN — LOSARTAN POTASSIUM 25 MG: 25 TABLET, FILM COATED ORAL at 07:47

## 2025-07-10 RX ADMIN — CARVEDILOL 6.25 MG: 6.25 TABLET, FILM COATED ORAL at 07:47

## 2025-07-10 RX ADMIN — Medication 1 SPRAY: at 12:44

## 2025-07-10 RX ADMIN — PHENOBARBITAL SODIUM 65 MG: 65 INJECTION INTRAMUSCULAR; INTRAVENOUS at 14:45

## 2025-07-10 RX ADMIN — IPRATROPIUM BROMIDE AND ALBUTEROL SULFATE 1 DOSE: .5; 2.5 SOLUTION RESPIRATORY (INHALATION) at 11:31

## 2025-07-10 RX ADMIN — Medication 10 MG: at 20:58

## 2025-07-10 RX ADMIN — SODIUM CHLORIDE, PRESERVATIVE FREE 10 ML: 5 INJECTION INTRAVENOUS at 20:58

## 2025-07-10 RX ADMIN — IPRATROPIUM BROMIDE AND ALBUTEROL SULFATE 1 DOSE: .5; 2.5 SOLUTION RESPIRATORY (INHALATION) at 00:00

## 2025-07-10 RX ADMIN — ASPIRIN 81 MG CHEWABLE TABLET 81 MG: 81 TABLET CHEWABLE at 07:47

## 2025-07-10 RX ADMIN — LEVETIRACETAM 500 MG: 100 INJECTION INTRAVENOUS at 20:57

## 2025-07-10 RX ADMIN — ACETAMINOPHEN 650 MG: 325 TABLET ORAL at 07:51

## 2025-07-10 RX ADMIN — IPRATROPIUM BROMIDE AND ALBUTEROL SULFATE 1 DOSE: .5; 2.5 SOLUTION RESPIRATORY (INHALATION) at 03:01

## 2025-07-10 RX ADMIN — LEVETIRACETAM 500 MG: 100 INJECTION INTRAVENOUS at 07:47

## 2025-07-10 RX ADMIN — IPRATROPIUM BROMIDE AND ALBUTEROL SULFATE 1 DOSE: .5; 2.5 SOLUTION RESPIRATORY (INHALATION) at 23:57

## 2025-07-10 RX ADMIN — OXYCODONE AND ACETAMINOPHEN 1 TABLET: 5; 325 TABLET ORAL at 01:01

## 2025-07-10 RX ADMIN — OXYCODONE AND ACETAMINOPHEN 1 TABLET: 5; 325 TABLET ORAL at 05:27

## 2025-07-10 RX ADMIN — MIDAZOLAM HYDROCHLORIDE 1 MG: 1 INJECTION, SOLUTION INTRAMUSCULAR; INTRAVENOUS at 16:11

## 2025-07-10 RX ADMIN — IPRATROPIUM BROMIDE AND ALBUTEROL SULFATE 1 DOSE: .5; 2.5 SOLUTION RESPIRATORY (INHALATION) at 16:15

## 2025-07-10 RX ADMIN — IPRATROPIUM BROMIDE AND ALBUTEROL SULFATE 1 DOSE: .5; 2.5 SOLUTION RESPIRATORY (INHALATION) at 19:40

## 2025-07-10 RX ADMIN — PHENOBARBITAL SODIUM 65 MG: 65 INJECTION INTRAMUSCULAR; INTRAVENOUS at 23:08

## 2025-07-10 RX ADMIN — BENZOCAINE AND MENTHOL 1 LOZENGE: 15; 3.6 LOZENGE ORAL at 11:34

## 2025-07-10 RX ADMIN — TICAGRELOR 90 MG: 90 TABLET ORAL at 07:47

## 2025-07-10 ASSESSMENT — PAIN SCALES - GENERAL
PAINLEVEL_OUTOF10: 2
PAINLEVEL_OUTOF10: 2
PAINLEVEL_OUTOF10: 0
PAINLEVEL_OUTOF10: 4
PAINLEVEL_OUTOF10: 0
PAINLEVEL_OUTOF10: 3
PAINLEVEL_OUTOF10: 5
PAINLEVEL_OUTOF10: 8
PAINLEVEL_OUTOF10: 8
PAINLEVEL_OUTOF10: 7
PAINLEVEL_OUTOF10: 8
PAINLEVEL_OUTOF10: 7
PAINLEVEL_OUTOF10: 2
PAINLEVEL_OUTOF10: 8
PAINLEVEL_OUTOF10: 2

## 2025-07-10 ASSESSMENT — PAIN DESCRIPTION - LOCATION
LOCATION: BACK;RIB CAGE
LOCATION: BACK;RIB CAGE
LOCATION: BACK
LOCATION: BACK

## 2025-07-10 ASSESSMENT — PAIN - FUNCTIONAL ASSESSMENT
PAIN_FUNCTIONAL_ASSESSMENT: ACTIVITIES ARE NOT PREVENTED
PAIN_FUNCTIONAL_ASSESSMENT: PREVENTS OR INTERFERES SOME ACTIVE ACTIVITIES AND ADLS
PAIN_FUNCTIONAL_ASSESSMENT: ACTIVITIES ARE NOT PREVENTED
PAIN_FUNCTIONAL_ASSESSMENT: ACTIVITIES ARE NOT PREVENTED

## 2025-07-10 ASSESSMENT — PAIN DESCRIPTION - ORIENTATION
ORIENTATION: RIGHT;LEFT;MID
ORIENTATION: MID

## 2025-07-10 ASSESSMENT — PAIN DESCRIPTION - DESCRIPTORS
DESCRIPTORS: ACHING;DISCOMFORT;SORE

## 2025-07-10 ASSESSMENT — PAIN DESCRIPTION - FREQUENCY: FREQUENCY: INTERMITTENT

## 2025-07-10 ASSESSMENT — PAIN DESCRIPTION - PAIN TYPE: TYPE: ACUTE PAIN;CHRONIC PAIN

## 2025-07-10 NOTE — PLAN OF CARE
Problem: Musculoskeletal - Adult  Goal: Return mobility to safest level of function  7/10/2025 0829 by Yajaira Fine RN  Outcome: Not Progressing  7/9/2025 2252 by Leticia Harden RN  Outcome: Progressing  Flowsheets (Taken 7/8/2025 0800 by Jes Bee, RN)  Return Mobility to Safest Level of Function:   Assess patient stability and activity tolerance for standing, transferring and ambulating with or without assistive devices   Ensure adequate protection for wounds/incisions during mobilization   Apply continuous passive motion per provider or physical therapy orders to increase flexion toward goal   Instruct patient/family in ordered activity level     Problem: Discharge Planning  Goal: Discharge to home or other facility with appropriate resources  7/10/2025 0829 by Yajaira Fine RN  Outcome: Progressing  7/9/2025 2252 by Leticia Haredn RN  Outcome: Progressing  Flowsheets (Taken 7/9/2025 2252)  Discharge to home or other facility with appropriate resources:   Identify barriers to discharge with patient and caregiver   Arrange for needed discharge resources and transportation as appropriate   Identify discharge learning needs (meds, wound care, etc)   Refer to discharge planning if patient needs post-hospital services based on physician order or complex needs related to functional status, cognitive ability or social support system   Arrange for interpreters to assist at discharge as needed     Problem: Safety - Adult  Goal: Free from fall injury  7/10/2025 0829 by Yajaira Fine RN  Outcome: Progressing  7/9/2025 2252 by Leticia Harden RN  Outcome: Progressing  Flowsheets (Taken 7/8/2025 1022 by Jes Bee, RN)  Free From Fall Injury:   Instruct family/caregiver on patient safety   Based on caregiver fall risk screen, instruct family/caregiver to ask for assistance with transferring infant if caregiver noted to have fall risk factors     Problem: ABCDS Injury Assessment  Goal: Absence of

## 2025-07-10 NOTE — PLAN OF CARE
Problem: Discharge Planning  Goal: Discharge to home or other facility with appropriate resources  7/9/2025 2252 by Leticia Harden RN  Outcome: Progressing  Flowsheets (Taken 7/9/2025 2252)  Discharge to home or other facility with appropriate resources:   Identify barriers to discharge with patient and caregiver   Arrange for needed discharge resources and transportation as appropriate   Identify discharge learning needs (meds, wound care, etc)   Refer to discharge planning if patient needs post-hospital services based on physician order or complex needs related to functional status, cognitive ability or social support system   Arrange for interpreters to assist at discharge as needed  7/9/2025 0925 by Yajaira Fine RN  Outcome: Not Progressing     Problem: Safety - Adult  Goal: Free from fall injury  7/9/2025 2252 by Leticia Harden RN  Outcome: Progressing  Flowsheets (Taken 7/8/2025 1022 by Jes Bee, RN)  Free From Fall Injury:   Instruct family/caregiver on patient safety   Based on caregiver fall risk screen, instruct family/caregiver to ask for assistance with transferring infant if caregiver noted to have fall risk factors  7/9/2025 0925 by Yajaira Fine RN  Outcome: Progressing     Problem: ABCDS Injury Assessment  Goal: Absence of physical injury  7/9/2025 2252 by Leticia Harden RN  Outcome: Progressing  Flowsheets (Taken 7/8/2025 1022 by Jes Bee, RN)  Absence of Physical Injury: Implement safety measures based on patient assessment  7/9/2025 0925 by Yajaira Fine RN  Outcome: Progressing     Problem: Pain  Goal: Verbalizes/displays adequate comfort level or baseline comfort level  7/9/2025 2252 by Leticia Harden RN  Outcome: Progressing  Flowsheets (Taken 7/8/2025 1218 by Jes Bee, RN)  Verbalizes/displays adequate comfort level or baseline comfort level:   Assess pain using appropriate pain scale   Administer analgesics based on type and severity of pain and

## 2025-07-11 ENCOUNTER — APPOINTMENT (OUTPATIENT)
Dept: GENERAL RADIOLOGY | Age: 55
DRG: 321 | End: 2025-07-11
Payer: COMMERCIAL

## 2025-07-11 LAB
ALBUMIN SERPL-MCNC: 3.5 G/DL (ref 3.5–5.2)
ALP SERPL-CCNC: 62 U/L (ref 40–129)
ALT SERPL-CCNC: 75 U/L (ref 0–50)
ANION GAP SERPL CALCULATED.3IONS-SCNC: 12 MMOL/L (ref 7–16)
AST SERPL-CCNC: 87 U/L (ref 0–50)
BASOPHILS # BLD: 0.02 K/UL (ref 0–0.2)
BASOPHILS NFR BLD: 0 % (ref 0–2)
BILIRUB SERPL-MCNC: 0.6 MG/DL (ref 0–1.2)
BUN SERPL-MCNC: 13 MG/DL (ref 6–20)
CALCIUM SERPL-MCNC: 9.2 MG/DL (ref 8.6–10)
CHLORIDE SERPL-SCNC: 100 MMOL/L (ref 98–107)
CO2 SERPL-SCNC: 24 MMOL/L (ref 22–29)
CREAT SERPL-MCNC: 0.9 MG/DL (ref 0.7–1.2)
EKG ATRIAL RATE: 80 BPM
EKG P AXIS: 71 DEGREES
EKG P-R INTERVAL: 138 MS
EKG Q-T INTERVAL: 390 MS
EKG QRS DURATION: 86 MS
EKG QTC CALCULATION (BAZETT): 449 MS
EKG R AXIS: 40 DEGREES
EKG T AXIS: -49 DEGREES
EKG VENTRICULAR RATE: 80 BPM
EOSINOPHIL # BLD: 0.07 K/UL (ref 0.05–0.5)
EOSINOPHILS RELATIVE PERCENT: 1 % (ref 0–6)
ERYTHROCYTE [DISTWIDTH] IN BLOOD BY AUTOMATED COUNT: 12.8 % (ref 11.5–15)
GFR, ESTIMATED: >90 ML/MIN/1.73M2
GLUCOSE SERPL-MCNC: 107 MG/DL (ref 74–99)
HCT VFR BLD AUTO: 39.6 % (ref 37–54)
HGB BLD-MCNC: 14.1 G/DL (ref 12.5–16.5)
IMM GRANULOCYTES # BLD AUTO: 0.04 K/UL (ref 0–0.58)
IMM GRANULOCYTES NFR BLD: 1 % (ref 0–5)
INR PPP: 1.1
LYMPHOCYTES NFR BLD: 1.2 K/UL (ref 1.5–4)
LYMPHOCYTES RELATIVE PERCENT: 14 % (ref 20–42)
MAGNESIUM SERPL-MCNC: 2.2 MG/DL (ref 1.6–2.6)
MCH RBC QN AUTO: 33.2 PG (ref 26–35)
MCHC RBC AUTO-ENTMCNC: 35.6 G/DL (ref 32–34.5)
MCV RBC AUTO: 93.2 FL (ref 80–99.9)
MONOCYTES NFR BLD: 0.75 K/UL (ref 0.1–0.95)
MONOCYTES NFR BLD: 9 % (ref 2–12)
NEUTROPHILS NFR BLD: 75 % (ref 43–80)
NEUTS SEG NFR BLD: 6.39 K/UL (ref 1.8–7.3)
PARTIAL THROMBOPLASTIN TIME: 29.7 SEC (ref 24.5–35.1)
PHOSPHATE SERPL-MCNC: 2.6 MG/DL (ref 2.5–4.5)
PLATELET # BLD AUTO: 211 K/UL (ref 130–450)
PMV BLD AUTO: 11.3 FL (ref 7–12)
POTASSIUM SERPL-SCNC: 3.5 MMOL/L (ref 3.5–5.1)
PROT SERPL-MCNC: 6.6 G/DL (ref 6.4–8.3)
PROTHROMBIN TIME: 12.4 SEC (ref 9.3–12.4)
RBC # BLD AUTO: 4.25 M/UL (ref 3.8–5.8)
SODIUM SERPL-SCNC: 136 MMOL/L (ref 136–145)
WBC OTHER # BLD: 8.5 K/UL (ref 4.5–11.5)

## 2025-07-11 PROCEDURE — 99233 SBSQ HOSP IP/OBS HIGH 50: CPT | Performed by: INTERNAL MEDICINE

## 2025-07-11 PROCEDURE — 6370000000 HC RX 637 (ALT 250 FOR IP): Performed by: INTERNAL MEDICINE

## 2025-07-11 PROCEDURE — 97129 THER IVNTJ 1ST 15 MIN: CPT

## 2025-07-11 PROCEDURE — 97130 THER IVNTJ EA ADDL 15 MIN: CPT

## 2025-07-11 PROCEDURE — 6360000002 HC RX W HCPCS: Performed by: NURSE PRACTITIONER

## 2025-07-11 PROCEDURE — 6370000000 HC RX 637 (ALT 250 FOR IP): Performed by: NURSE PRACTITIONER

## 2025-07-11 PROCEDURE — 83735 ASSAY OF MAGNESIUM: CPT

## 2025-07-11 PROCEDURE — 93010 ELECTROCARDIOGRAM REPORT: CPT | Performed by: INTERNAL MEDICINE

## 2025-07-11 PROCEDURE — 97530 THERAPEUTIC ACTIVITIES: CPT

## 2025-07-11 PROCEDURE — 80053 COMPREHEN METABOLIC PANEL: CPT

## 2025-07-11 PROCEDURE — 6360000002 HC RX W HCPCS: Performed by: INTERNAL MEDICINE

## 2025-07-11 PROCEDURE — 94640 AIRWAY INHALATION TREATMENT: CPT

## 2025-07-11 PROCEDURE — 85610 PROTHROMBIN TIME: CPT

## 2025-07-11 PROCEDURE — 71045 X-RAY EXAM CHEST 1 VIEW: CPT

## 2025-07-11 PROCEDURE — 2500000003 HC RX 250 WO HCPCS: Performed by: NURSE PRACTITIONER

## 2025-07-11 PROCEDURE — 97535 SELF CARE MNGMENT TRAINING: CPT

## 2025-07-11 PROCEDURE — 85025 COMPLETE CBC W/AUTO DIFF WBC: CPT

## 2025-07-11 PROCEDURE — 93005 ELECTROCARDIOGRAM TRACING: CPT | Performed by: INTERNAL MEDICINE

## 2025-07-11 PROCEDURE — 85730 THROMBOPLASTIN TIME PARTIAL: CPT

## 2025-07-11 PROCEDURE — 99291 CRITICAL CARE FIRST HOUR: CPT | Performed by: INTERNAL MEDICINE

## 2025-07-11 PROCEDURE — 84100 ASSAY OF PHOSPHORUS: CPT

## 2025-07-11 PROCEDURE — 2060000000 HC ICU INTERMEDIATE R&B

## 2025-07-11 PROCEDURE — 2580000003 HC RX 258: Performed by: NURSE PRACTITIONER

## 2025-07-11 RX ORDER — PHENOBARBITAL SODIUM 130 MG/ML
130 INJECTION, SOLUTION INTRAMUSCULAR; INTRAVENOUS EVERY 6 HOURS
Status: DISCONTINUED | OUTPATIENT
Start: 2025-07-11 | End: 2025-07-11 | Stop reason: SDUPTHER

## 2025-07-11 RX ORDER — PHENOBARBITAL SODIUM 65 MG/ML
32.5 INJECTION, SOLUTION INTRAMUSCULAR; INTRAVENOUS EVERY 12 HOURS
Status: DISCONTINUED | OUTPATIENT
Start: 2025-07-13 | End: 2025-07-11 | Stop reason: SDUPTHER

## 2025-07-11 RX ORDER — PHENOBARBITAL SODIUM 65 MG/ML
65 INJECTION, SOLUTION INTRAMUSCULAR; INTRAVENOUS EVERY 6 HOURS
Status: DISCONTINUED | OUTPATIENT
Start: 2025-07-12 | End: 2025-07-11 | Stop reason: SDUPTHER

## 2025-07-11 RX ORDER — THIAMINE HYDROCHLORIDE 100 MG/ML
100 INJECTION, SOLUTION INTRAMUSCULAR; INTRAVENOUS DAILY
Status: DISCONTINUED | OUTPATIENT
Start: 2025-07-11 | End: 2025-07-16 | Stop reason: HOSPADM

## 2025-07-11 RX ORDER — PHENOBARBITAL SODIUM 65 MG/ML
32.5 INJECTION, SOLUTION INTRAMUSCULAR; INTRAVENOUS EVERY 12 HOURS
Status: DISCONTINUED | OUTPATIENT
Start: 2025-07-13 | End: 2025-07-13

## 2025-07-11 RX ORDER — PHENOBARBITAL SODIUM 65 MG/ML
32.5 INJECTION, SOLUTION INTRAMUSCULAR; INTRAVENOUS EVERY 6 HOURS PRN
Status: ACTIVE | OUTPATIENT
Start: 2025-07-12 | End: 2025-07-14

## 2025-07-11 RX ORDER — PHENOBARBITAL SODIUM 65 MG/ML
16.2 INJECTION, SOLUTION INTRAMUSCULAR; INTRAVENOUS EVERY 6 HOURS PRN
Status: ACTIVE | OUTPATIENT
Start: 2025-07-14 | End: 2025-07-15

## 2025-07-11 RX ORDER — CARVEDILOL 25 MG/1
25 TABLET ORAL 2 TIMES DAILY WITH MEALS
Status: DISCONTINUED | OUTPATIENT
Start: 2025-07-11 | End: 2025-07-16 | Stop reason: HOSPADM

## 2025-07-11 RX ORDER — QUETIAPINE FUMARATE 25 MG/1
25 TABLET, FILM COATED ORAL ONCE
Status: COMPLETED | OUTPATIENT
Start: 2025-07-11 | End: 2025-07-11

## 2025-07-11 RX ORDER — IPRATROPIUM BROMIDE AND ALBUTEROL SULFATE 2.5; .5 MG/3ML; MG/3ML
1 SOLUTION RESPIRATORY (INHALATION)
Status: DISCONTINUED | OUTPATIENT
Start: 2025-07-11 | End: 2025-07-12

## 2025-07-11 RX ORDER — POTASSIUM CHLORIDE 1500 MG/1
40 TABLET, EXTENDED RELEASE ORAL ONCE
Status: COMPLETED | OUTPATIENT
Start: 2025-07-11 | End: 2025-07-11

## 2025-07-11 RX ORDER — PHENOBARBITAL SODIUM 65 MG/ML
130 INJECTION, SOLUTION INTRAMUSCULAR; INTRAVENOUS EVERY 6 HOURS
Status: COMPLETED | OUTPATIENT
Start: 2025-07-11 | End: 2025-07-12

## 2025-07-11 RX ORDER — PHENOBARBITAL SODIUM 65 MG/ML
65 INJECTION, SOLUTION INTRAMUSCULAR; INTRAVENOUS EVERY 6 HOURS PRN
Status: ACTIVE | OUTPATIENT
Start: 2025-07-11 | End: 2025-07-12

## 2025-07-11 RX ORDER — PHENOBARBITAL SODIUM 65 MG/ML
16.2 INJECTION, SOLUTION INTRAMUSCULAR; INTRAVENOUS EVERY 12 HOURS
Status: DISCONTINUED | OUTPATIENT
Start: 2025-07-14 | End: 2025-07-11 | Stop reason: SDUPTHER

## 2025-07-11 RX ORDER — PHENOBARBITAL SODIUM 65 MG/ML
65 INJECTION, SOLUTION INTRAMUSCULAR; INTRAVENOUS EVERY 6 HOURS
Status: DISCONTINUED | OUTPATIENT
Start: 2025-07-12 | End: 2025-07-13

## 2025-07-11 RX ORDER — FOLIC ACID 1 MG/1
1 TABLET ORAL DAILY
Status: DISCONTINUED | OUTPATIENT
Start: 2025-07-11 | End: 2025-07-16 | Stop reason: HOSPADM

## 2025-07-11 RX ORDER — PHENOBARBITAL SODIUM 65 MG/ML
16.2 INJECTION, SOLUTION INTRAMUSCULAR; INTRAVENOUS EVERY 12 HOURS
Status: DISCONTINUED | OUTPATIENT
Start: 2025-07-14 | End: 2025-07-13

## 2025-07-11 RX ORDER — IPRATROPIUM BROMIDE AND ALBUTEROL SULFATE 2.5; .5 MG/3ML; MG/3ML
1 SOLUTION RESPIRATORY (INHALATION) EVERY 4 HOURS PRN
Status: DISCONTINUED | OUTPATIENT
Start: 2025-07-11 | End: 2025-07-12

## 2025-07-11 RX ADMIN — LEVETIRACETAM 500 MG: 100 INJECTION INTRAVENOUS at 20:41

## 2025-07-11 RX ADMIN — PHENOBARBITAL SODIUM 65 MG: 65 INJECTION INTRAMUSCULAR; INTRAVENOUS at 05:44

## 2025-07-11 RX ADMIN — POTASSIUM CHLORIDE 40 MEQ: 1500 TABLET, EXTENDED RELEASE ORAL at 08:59

## 2025-07-11 RX ADMIN — PHENOBARBITAL SODIUM 130 MG: 65 INJECTION INTRAMUSCULAR; INTRAVENOUS at 10:10

## 2025-07-11 RX ADMIN — ROSUVASTATIN 40 MG: 20 TABLET, FILM COATED ORAL at 20:41

## 2025-07-11 RX ADMIN — ASPIRIN 81 MG CHEWABLE TABLET 81 MG: 81 TABLET CHEWABLE at 08:59

## 2025-07-11 RX ADMIN — CARVEDILOL 25 MG: 25 TABLET, FILM COATED ORAL at 17:11

## 2025-07-11 RX ADMIN — QUETIAPINE FUMARATE 25 MG: 25 TABLET ORAL at 21:09

## 2025-07-11 RX ADMIN — LEVETIRACETAM 500 MG: 100 INJECTION INTRAVENOUS at 09:00

## 2025-07-11 RX ADMIN — SODIUM CHLORIDE, PRESERVATIVE FREE 10 ML: 5 INJECTION INTRAVENOUS at 20:48

## 2025-07-11 RX ADMIN — LOSARTAN POTASSIUM 25 MG: 25 TABLET, FILM COATED ORAL at 08:59

## 2025-07-11 RX ADMIN — FOLIC ACID 1 MG: 1 TABLET ORAL at 10:10

## 2025-07-11 RX ADMIN — SODIUM CHLORIDE, PRESERVATIVE FREE 10 ML: 5 INJECTION INTRAVENOUS at 09:00

## 2025-07-11 RX ADMIN — IPRATROPIUM BROMIDE AND ALBUTEROL SULFATE 1 DOSE: .5; 2.5 SOLUTION RESPIRATORY (INHALATION) at 13:40

## 2025-07-11 RX ADMIN — IPRATROPIUM BROMIDE AND ALBUTEROL SULFATE 1 DOSE: .5; 2.5 SOLUTION RESPIRATORY (INHALATION) at 07:46

## 2025-07-11 RX ADMIN — PHENOBARBITAL SODIUM 130 MG: 65 INJECTION INTRAMUSCULAR; INTRAVENOUS at 22:03

## 2025-07-11 RX ADMIN — POTASSIUM PHOSPHATE, MONOBASIC AND POTASSIUM PHOSPHATE, DIBASIC 15 MMOL: 224; 236 INJECTION, SOLUTION, CONCENTRATE INTRAVENOUS at 10:09

## 2025-07-11 RX ADMIN — CARVEDILOL 25 MG: 25 TABLET, FILM COATED ORAL at 08:59

## 2025-07-11 RX ADMIN — TICAGRELOR 90 MG: 90 TABLET ORAL at 08:59

## 2025-07-11 RX ADMIN — ENOXAPARIN SODIUM 40 MG: 100 INJECTION SUBCUTANEOUS at 09:00

## 2025-07-11 RX ADMIN — TICAGRELOR 90 MG: 90 TABLET ORAL at 20:41

## 2025-07-11 RX ADMIN — PHENOBARBITAL SODIUM 130 MG: 65 INJECTION INTRAMUSCULAR; INTRAVENOUS at 17:11

## 2025-07-11 RX ADMIN — Medication 10 MG: at 20:41

## 2025-07-11 RX ADMIN — IPRATROPIUM BROMIDE AND ALBUTEROL SULFATE 1 DOSE: .5; 2.5 SOLUTION RESPIRATORY (INHALATION) at 20:34

## 2025-07-11 RX ADMIN — THIAMINE HYDROCHLORIDE 100 MG: 100 INJECTION, SOLUTION INTRAMUSCULAR; INTRAVENOUS at 10:10

## 2025-07-11 ASSESSMENT — PAIN SCALES - GENERAL: PAINLEVEL_OUTOF10: 0

## 2025-07-11 NOTE — CARE COORDINATION
Care Coordination :LOS  4 day.  Spoke with pt and mom yesterday at bedside, pt up in chair.  Slightly confused on events. He informed me that he works for [email or web address] and was not sure if this would be covered under workman's comp,  Mom states he was independent pta. Plan would be to return home with wife and family will transport. Follows with Dr Carolin León. No hx of hhc, DME or Per.  Does not anticipate any home going needs, states he walked down the peters with therapy using a walker-- he is unclear on DME needed and has no preference of provider.  Upon review of soft chart, there is no \"first report of injury\" filled out for potential workman's comp. I confirmed with SW in ER, this was not started.  I did contact Estephania Callaway this morning .  Spoke to Rolanda and per JEN Tabares, this will not be considered as a workman's comp.  Will follow for transition of care needs      Electronically signed by Ca Sanchez RN on 7/11/2025 at 9:23 AM

## 2025-07-11 NOTE — PLAN OF CARE
Problem: Discharge Planning  Goal: Discharge to home or other facility with appropriate resources  7/11/2025 0920 by Yajaira Fine RN  Outcome: Not Progressing  7/10/2025 2316 by Lizz Triplett RN  Outcome: Progressing     Problem: Pain  Goal: Verbalizes/displays adequate comfort level or baseline comfort level  7/11/2025 0920 by Yajaira Fine RN  Outcome: Not Progressing  7/10/2025 2316 by Lizz Triplett RN  Outcome: Progressing     Problem: Neurosensory - Adult  Goal: Achieves stable or improved neurological status  7/11/2025 0920 by Yajaira Fine RN  Outcome: Not Progressing  7/10/2025 2316 by Lizz Triplett RN  Outcome: Progressing     Problem: Safety - Adult  Goal: Free from fall injury  7/11/2025 0920 by Yajaira Fine RN  Outcome: Progressing  7/10/2025 2316 by Lizz Triplett RN  Outcome: Progressing  Flowsheets (Taken 7/10/2025 2315)  Free From Fall Injury: Instruct family/caregiver on patient safety     Problem: ABCDS Injury Assessment  Goal: Absence of physical injury  7/11/2025 0920 by Yajaira Fine RN  Outcome: Progressing  7/10/2025 2316 by Lizz Triplett RN  Outcome: Progressing  Flowsheets (Taken 7/10/2025 2315)  Absence of Physical Injury: Implement safety measures based on patient assessment     Problem: Respiratory - Adult  Goal: Achieves optimal ventilation and oxygenation  7/11/2025 0920 by Yajaira Fine RN  Outcome: Progressing  7/10/2025 2316 by Lizz Triplett RN  Outcome: Progressing     Problem: Skin/Tissue Integrity  Goal: Skin integrity remains intact  Description: 1.  Monitor for areas of redness and/or skin breakdown  2.  Assess vascular access sites hourly  3.  Every 4-6 hours minimum:  Change oxygen saturation probe site  4.  Every 4-6 hours:  If on nasal continuous positive airway pressure, respiratory therapy assess nares and determine need for appliance change or resting period  7/11/2025 0920 by Yajaira Fine RN  Outcome: Progressing  7/10/2025 2316 by

## 2025-07-11 NOTE — PLAN OF CARE
Problem: Discharge Planning  Goal: Discharge to home or other facility with appropriate resources  Outcome: Progressing     Problem: Safety - Adult  Goal: Free from fall injury  Outcome: Progressing  Flowsheets (Taken 7/10/2025 2315)  Free From Fall Injury: Instruct family/caregiver on patient safety     Problem: ABCDS Injury Assessment  Goal: Absence of physical injury  Outcome: Progressing  Flowsheets (Taken 7/10/2025 2315)  Absence of Physical Injury: Implement safety measures based on patient assessment     Problem: Pain  Goal: Verbalizes/displays adequate comfort level or baseline comfort level  Outcome: Progressing     Problem: Respiratory - Adult  Goal: Achieves optimal ventilation and oxygenation  Outcome: Progressing     Problem: Neurosensory - Adult  Goal: Achieves stable or improved neurological status  Outcome: Progressing  Goal: Absence of seizures  Outcome: Progressing  Goal: Achieves maximal functionality and self care  Outcome: Progressing     Problem: Cardiovascular - Adult  Goal: Maintains optimal cardiac output and hemodynamic stability  Outcome: Progressing  Goal: Absence of cardiac dysrhythmias or at baseline  Outcome: Progressing     Problem: Skin/Tissue Integrity - Adult  Goal: Skin integrity remains intact  Description: 1.  Monitor for areas of redness and/or skin breakdown  2.  Assess vascular access sites hourly  3.  Every 4-6 hours minimum:  Change oxygen saturation probe site  4.  Every 4-6 hours:  If on nasal continuous positive airway pressure, respiratory therapy assess nares and determine need for appliance change or resting period  Outcome: Progressing  Flowsheets (Taken 7/10/2025 2315)  Skin Integrity Remains Intact: Monitor for areas of redness and/or skin breakdown  Goal: Oral mucous membranes remain intact  Outcome: Progressing  Flowsheets (Taken 7/10/2025 2315)  Oral Mucous Membranes Remain Intact: Assess oral mucosa and hygiene practices     Problem: Musculoskeletal -

## 2025-07-11 NOTE — CARE COORDINATION
Care Coordination: LOS 4 day.  Multiple visitors in room, met with wife briefly and then walked to peters to continue discussion. I wanted to review with her that I did check with place of employment and spoke to Rolanda- who confirmed with JEN George that this would not be a workman's comp claim.  I just wanted to update her that a First report of injury was not filled out as well.  I provided her with the number I called, so she can also follow up on it as well. She understands he has coverage with his current medical insurance and may have deduct able. We discussed possible need for DME at discharge, as pt ambulated with a wheeled walker, she is not sure he will need a walker. she wants to see how he does with therapy over the weekend. I explained- generally therapy does not follow on weekend, but once he transfers to [facility], they can call- if therapy is available. Also encouraged up to chair and ambulating to bathroom with assist.  Depending on time of intervention on Monday, she can check with nursing to see if Therapy can work with pt prior to intervention and also on day of discharge.  She appears frustrated and asked, \" shouldn't therapy know the time of everything and prioritize his therapy\".  I reassured her they are in communication with nursing ,but may not have cath lab schedule at the time.  Wife also feels pt will be here longer than Tuesday, as neurologically, he has not recovered and feels they cannot discharge him until further known.  I asked her to check with attending as neurology is following peripherally as needed-- with the intent to see him as outpt.   Discussed cardiac rehab as well with wife, she is checking with a friend who has various centers as she has not had a \"good experience\" with [facility] cardiac rehab in the past.  If no other options,she will call to schedule. She appears overwhelmed at the moment, ended our conversation and walked back to room.      Electronically signed by

## 2025-07-12 ENCOUNTER — APPOINTMENT (OUTPATIENT)
Dept: GENERAL RADIOLOGY | Age: 55
DRG: 321 | End: 2025-07-12
Payer: COMMERCIAL

## 2025-07-12 LAB
ALBUMIN SERPL-MCNC: 3.4 G/DL (ref 3.5–5.2)
ALP SERPL-CCNC: 60 U/L (ref 40–129)
ALT SERPL-CCNC: 81 U/L (ref 0–50)
ANION GAP SERPL CALCULATED.3IONS-SCNC: 12 MMOL/L (ref 7–16)
AST SERPL-CCNC: 68 U/L (ref 0–50)
BASOPHILS # BLD: 0.03 K/UL (ref 0–0.2)
BASOPHILS NFR BLD: 1 % (ref 0–2)
BILIRUB SERPL-MCNC: 0.5 MG/DL (ref 0–1.2)
BUN SERPL-MCNC: 16 MG/DL (ref 6–20)
CALCIUM SERPL-MCNC: 8.8 MG/DL (ref 8.6–10)
CHLORIDE SERPL-SCNC: 102 MMOL/L (ref 98–107)
CO2 SERPL-SCNC: 22 MMOL/L (ref 22–29)
CREAT SERPL-MCNC: 1 MG/DL (ref 0.7–1.2)
EKG ATRIAL RATE: 86 BPM
EKG P AXIS: 76 DEGREES
EKG P-R INTERVAL: 136 MS
EKG Q-T INTERVAL: 500 MS
EKG QRS DURATION: 80 MS
EKG QTC CALCULATION (BAZETT): 598 MS
EKG R AXIS: 66 DEGREES
EKG T AXIS: -65 DEGREES
EKG VENTRICULAR RATE: 86 BPM
EOSINOPHIL # BLD: 0.1 K/UL (ref 0.05–0.5)
EOSINOPHILS RELATIVE PERCENT: 2 % (ref 0–6)
ERYTHROCYTE [DISTWIDTH] IN BLOOD BY AUTOMATED COUNT: 13 % (ref 11.5–15)
GFR, ESTIMATED: 88 ML/MIN/1.73M2
GLUCOSE SERPL-MCNC: 114 MG/DL (ref 74–99)
HCT VFR BLD AUTO: 39.9 % (ref 37–54)
HGB BLD-MCNC: 14 G/DL (ref 12.5–16.5)
IMM GRANULOCYTES # BLD AUTO: <0.03 K/UL (ref 0–0.58)
IMM GRANULOCYTES NFR BLD: 0 % (ref 0–5)
INR PPP: 1.1
LYMPHOCYTES NFR BLD: 1.05 K/UL (ref 1.5–4)
LYMPHOCYTES RELATIVE PERCENT: 19 % (ref 20–42)
MAGNESIUM SERPL-MCNC: 2.1 MG/DL (ref 1.6–2.6)
MCH RBC QN AUTO: 33.2 PG (ref 26–35)
MCHC RBC AUTO-ENTMCNC: 35.1 G/DL (ref 32–34.5)
MCV RBC AUTO: 94.5 FL (ref 80–99.9)
MONOCYTES NFR BLD: 0.65 K/UL (ref 0.1–0.95)
MONOCYTES NFR BLD: 12 % (ref 2–12)
NEUTROPHILS NFR BLD: 67 % (ref 43–80)
NEUTS SEG NFR BLD: 3.8 K/UL (ref 1.8–7.3)
PARTIAL THROMBOPLASTIN TIME: 29 SEC (ref 24.5–35.1)
PHOSPHATE SERPL-MCNC: 3.5 MG/DL (ref 2.5–4.5)
PLATELET # BLD AUTO: 237 K/UL (ref 130–450)
PMV BLD AUTO: 11.1 FL (ref 7–12)
POTASSIUM SERPL-SCNC: 3.5 MMOL/L (ref 3.5–5.1)
PROT SERPL-MCNC: 6.3 G/DL (ref 6.4–8.3)
PROTHROMBIN TIME: 12.3 SEC (ref 9.3–12.4)
RBC # BLD AUTO: 4.22 M/UL (ref 3.8–5.8)
SODIUM SERPL-SCNC: 135 MMOL/L (ref 136–145)
WBC OTHER # BLD: 5.7 K/UL (ref 4.5–11.5)

## 2025-07-12 PROCEDURE — 80053 COMPREHEN METABOLIC PANEL: CPT

## 2025-07-12 PROCEDURE — 6360000002 HC RX W HCPCS: Performed by: NURSE PRACTITIONER

## 2025-07-12 PROCEDURE — 2500000003 HC RX 250 WO HCPCS: Performed by: NURSE PRACTITIONER

## 2025-07-12 PROCEDURE — 85730 THROMBOPLASTIN TIME PARTIAL: CPT

## 2025-07-12 PROCEDURE — 85610 PROTHROMBIN TIME: CPT

## 2025-07-12 PROCEDURE — 6370000000 HC RX 637 (ALT 250 FOR IP): Performed by: INTERNAL MEDICINE

## 2025-07-12 PROCEDURE — 85025 COMPLETE CBC W/AUTO DIFF WBC: CPT

## 2025-07-12 PROCEDURE — 6360000002 HC RX W HCPCS: Performed by: INTERNAL MEDICINE

## 2025-07-12 PROCEDURE — 2060000000 HC ICU INTERMEDIATE R&B

## 2025-07-12 PROCEDURE — 99232 SBSQ HOSP IP/OBS MODERATE 35: CPT | Performed by: INTERNAL MEDICINE

## 2025-07-12 PROCEDURE — 71045 X-RAY EXAM CHEST 1 VIEW: CPT

## 2025-07-12 PROCEDURE — 99291 CRITICAL CARE FIRST HOUR: CPT | Performed by: INTERNAL MEDICINE

## 2025-07-12 PROCEDURE — 6370000000 HC RX 637 (ALT 250 FOR IP): Performed by: NURSE PRACTITIONER

## 2025-07-12 PROCEDURE — 83735 ASSAY OF MAGNESIUM: CPT

## 2025-07-12 PROCEDURE — 84100 ASSAY OF PHOSPHORUS: CPT

## 2025-07-12 RX ORDER — IPRATROPIUM BROMIDE AND ALBUTEROL SULFATE 2.5; .5 MG/3ML; MG/3ML
1 SOLUTION RESPIRATORY (INHALATION) EVERY 4 HOURS PRN
Status: DISCONTINUED | OUTPATIENT
Start: 2025-07-12 | End: 2025-07-16 | Stop reason: HOSPADM

## 2025-07-12 RX ORDER — POTASSIUM CHLORIDE 1500 MG/1
40 TABLET, EXTENDED RELEASE ORAL ONCE
Status: DISCONTINUED | OUTPATIENT
Start: 2025-07-12 | End: 2025-07-13

## 2025-07-12 RX ORDER — POTASSIUM CHLORIDE 1500 MG/1
40 TABLET, EXTENDED RELEASE ORAL ONCE
Status: COMPLETED | OUTPATIENT
Start: 2025-07-12 | End: 2025-07-12

## 2025-07-12 RX ADMIN — FOLIC ACID 1 MG: 1 TABLET ORAL at 07:42

## 2025-07-12 RX ADMIN — PHENOBARBITAL SODIUM 130 MG: 65 INJECTION INTRAMUSCULAR; INTRAVENOUS at 04:07

## 2025-07-12 RX ADMIN — TICAGRELOR 90 MG: 90 TABLET ORAL at 07:42

## 2025-07-12 RX ADMIN — PHENOBARBITAL SODIUM 65 MG: 65 INJECTION INTRAMUSCULAR; INTRAVENOUS at 09:46

## 2025-07-12 RX ADMIN — LEVETIRACETAM 500 MG: 100 INJECTION INTRAVENOUS at 20:58

## 2025-07-12 RX ADMIN — PHENOBARBITAL SODIUM 65 MG: 65 INJECTION INTRAMUSCULAR; INTRAVENOUS at 20:58

## 2025-07-12 RX ADMIN — POTASSIUM CHLORIDE 40 MEQ: 1500 TABLET, EXTENDED RELEASE ORAL at 06:30

## 2025-07-12 RX ADMIN — Medication 10 MG: at 20:57

## 2025-07-12 RX ADMIN — LEVETIRACETAM 500 MG: 100 INJECTION INTRAVENOUS at 07:42

## 2025-07-12 RX ADMIN — TICAGRELOR 90 MG: 90 TABLET ORAL at 20:58

## 2025-07-12 RX ADMIN — SODIUM CHLORIDE, PRESERVATIVE FREE 10 ML: 5 INJECTION INTRAVENOUS at 20:58

## 2025-07-12 RX ADMIN — SODIUM CHLORIDE, PRESERVATIVE FREE 10 ML: 5 INJECTION INTRAVENOUS at 07:42

## 2025-07-12 RX ADMIN — THIAMINE HYDROCHLORIDE 100 MG: 100 INJECTION, SOLUTION INTRAMUSCULAR; INTRAVENOUS at 07:41

## 2025-07-12 RX ADMIN — LOSARTAN POTASSIUM 25 MG: 25 TABLET, FILM COATED ORAL at 07:42

## 2025-07-12 RX ADMIN — ROSUVASTATIN 40 MG: 20 TABLET, FILM COATED ORAL at 20:57

## 2025-07-12 RX ADMIN — CARVEDILOL 25 MG: 25 TABLET, FILM COATED ORAL at 07:42

## 2025-07-12 RX ADMIN — ASPIRIN 81 MG CHEWABLE TABLET 81 MG: 81 TABLET CHEWABLE at 07:42

## 2025-07-12 ASSESSMENT — PAIN SCALES - GENERAL
PAINLEVEL_OUTOF10: 0
PAINLEVEL_OUTOF10: 0

## 2025-07-12 NOTE — RT PROTOCOL NOTE
Bronchodilator order(s) to two equivalent RT bronchodilator orders with one order with TID Frequency and one order with Frequency of every 4 hours PRN wheezing or increased work of breathing using Per Protocol order mode.       11-13 - enter or revise RT Bronchodilator order(s) to one equivalent RT bronchodilator order with QID Frequency and an Albuterol order with Frequency of every 4 hours PRN wheezing or increased work of breathing using Per Protocol order mode.      Greater than 13 - enter or revise RT Bronchodilator order(s) to one equivalent RT bronchodilator order with every 4 hours Frequency and an Albuterol order with Frequency of every 2 hours PRN wheezing or increased work of breathing using Per Protocol order mode.         Electronically signed by Kristin Flynn RCP on 7/12/2025 at 7:20 AM

## 2025-07-12 NOTE — PLAN OF CARE
Problem: Discharge Planning  Goal: Discharge to home or other facility with appropriate resources  7/12/2025 1008 by Magdalena Jordan RN  Outcome: Progressing  Flowsheets (Taken 7/12/2025 0800)  Discharge to home or other facility with appropriate resources: Identify barriers to discharge with patient and caregiver  7/12/2025 0517 by Panfilo Chambers RN  Outcome: Progressing     Problem: Safety - Adult  Goal: Free from fall injury  7/12/2025 1008 by Magdalena Jordan RN  Outcome: Progressing  7/12/2025 0517 by Panfilo Chambers RN  Outcome: Progressing     Problem: ABCDS Injury Assessment  Goal: Absence of physical injury  7/12/2025 1008 by Magdalena Jordan RN  Outcome: Progressing  7/12/2025 0517 by Panfilo Chambers RN  Outcome: Progressing     Problem: Pain  Goal: Verbalizes/displays adequate comfort level or baseline comfort level  7/12/2025 1008 by Magdalena Jordan RN  Outcome: Progressing  Flowsheets (Taken 7/12/2025 0800)  Verbalizes/displays adequate comfort level or baseline comfort level: Encourage patient to monitor pain and request assistance  7/12/2025 0517 by Panfilo Chambers RN  Outcome: Progressing     Problem: Respiratory - Adult  Goal: Achieves optimal ventilation and oxygenation  7/12/2025 1008 by Magdalena Jordan RN  Outcome: Progressing  Flowsheets (Taken 7/12/2025 0800)  Achieves optimal ventilation and oxygenation: Assess for changes in respiratory status  7/12/2025 0517 by Panfilo Chambers RN  Outcome: Progressing     Problem: Skin/Tissue Integrity  Goal: Skin integrity remains intact  Description: 1.  Monitor for areas of redness and/or skin breakdown  2.  Assess vascular access sites hourly  3.  Every 4-6 hours minimum:  Change oxygen saturation probe site  4.  Every 4-6 hours:  If on nasal continuous positive airway pressure, respiratory therapy assess nares and determine need for appliance change or resting period  7/12/2025 1008 by Magdalena Jordan RN  Outcome: Progressing  Flowsheets (Taken 7/12/2025

## 2025-07-13 ENCOUNTER — APPOINTMENT (OUTPATIENT)
Dept: GENERAL RADIOLOGY | Age: 55
DRG: 321 | End: 2025-07-13
Payer: COMMERCIAL

## 2025-07-13 LAB
ALBUMIN SERPL-MCNC: 3.4 G/DL (ref 3.5–5.2)
ALP SERPL-CCNC: 61 U/L (ref 40–129)
ALT SERPL-CCNC: 88 U/L (ref 0–50)
ANION GAP SERPL CALCULATED.3IONS-SCNC: 11 MMOL/L (ref 7–16)
AST SERPL-CCNC: 60 U/L (ref 0–50)
BASOPHILS # BLD: 0 K/UL (ref 0–0.2)
BASOPHILS NFR BLD: 0 % (ref 0–2)
BILIRUB SERPL-MCNC: 0.4 MG/DL (ref 0–1.2)
BUN SERPL-MCNC: 14 MG/DL (ref 6–20)
CALCIUM SERPL-MCNC: 8.7 MG/DL (ref 8.6–10)
CHLORIDE SERPL-SCNC: 103 MMOL/L (ref 98–107)
CO2 SERPL-SCNC: 22 MMOL/L (ref 22–29)
CREAT SERPL-MCNC: 0.9 MG/DL (ref 0.7–1.2)
EOSINOPHIL # BLD: 0.11 K/UL (ref 0.05–0.5)
EOSINOPHILS RELATIVE PERCENT: 2 % (ref 0–6)
ERYTHROCYTE [DISTWIDTH] IN BLOOD BY AUTOMATED COUNT: 12.9 % (ref 11.5–15)
GFR, ESTIMATED: >90 ML/MIN/1.73M2
GLUCOSE SERPL-MCNC: 114 MG/DL (ref 74–99)
HCT VFR BLD AUTO: 37.7 % (ref 37–54)
HGB BLD-MCNC: 13.1 G/DL (ref 12.5–16.5)
INR PPP: 1.1
LYMPHOCYTES NFR BLD: 0.93 K/UL (ref 1.5–4)
LYMPHOCYTES RELATIVE PERCENT: 15 % (ref 20–42)
MAGNESIUM SERPL-MCNC: 2.2 MG/DL (ref 1.6–2.6)
MCH RBC QN AUTO: 32.7 PG (ref 26–35)
MCHC RBC AUTO-ENTMCNC: 34.7 G/DL (ref 32–34.5)
MCV RBC AUTO: 94 FL (ref 80–99.9)
MONOCYTES NFR BLD: 0.44 K/UL (ref 0.1–0.95)
MONOCYTES NFR BLD: 7 % (ref 2–12)
NEUTROPHILS NFR BLD: 77 % (ref 43–80)
NEUTS SEG NFR BLD: 4.82 K/UL (ref 1.8–7.3)
PARTIAL THROMBOPLASTIN TIME: 30.4 SEC (ref 24.5–35.1)
PHOSPHATE SERPL-MCNC: 3.2 MG/DL (ref 2.5–4.5)
PLATELET # BLD AUTO: 245 K/UL (ref 130–450)
PMV BLD AUTO: 10.6 FL (ref 7–12)
POTASSIUM SERPL-SCNC: 3.8 MMOL/L (ref 3.5–5.1)
PROT SERPL-MCNC: 6.2 G/DL (ref 6.4–8.3)
PROTHROMBIN TIME: 12 SEC (ref 9.3–12.4)
RBC # BLD AUTO: 4.01 M/UL (ref 3.8–5.8)
RBC # BLD: ABNORMAL 10*6/UL
SODIUM SERPL-SCNC: 136 MMOL/L (ref 136–145)
WBC OTHER # BLD: 6.3 K/UL (ref 4.5–11.5)

## 2025-07-13 PROCEDURE — 6360000002 HC RX W HCPCS: Performed by: NURSE PRACTITIONER

## 2025-07-13 PROCEDURE — 6370000000 HC RX 637 (ALT 250 FOR IP): Performed by: NURSE PRACTITIONER

## 2025-07-13 PROCEDURE — 2140000000 HC CCU INTERMEDIATE R&B

## 2025-07-13 PROCEDURE — 2500000003 HC RX 250 WO HCPCS: Performed by: NURSE PRACTITIONER

## 2025-07-13 PROCEDURE — 85610 PROTHROMBIN TIME: CPT

## 2025-07-13 PROCEDURE — 6370000000 HC RX 637 (ALT 250 FOR IP)

## 2025-07-13 PROCEDURE — 71045 X-RAY EXAM CHEST 1 VIEW: CPT

## 2025-07-13 PROCEDURE — 99232 SBSQ HOSP IP/OBS MODERATE 35: CPT | Performed by: INTERNAL MEDICINE

## 2025-07-13 PROCEDURE — 6360000002 HC RX W HCPCS: Performed by: INTERNAL MEDICINE

## 2025-07-13 PROCEDURE — 6370000000 HC RX 637 (ALT 250 FOR IP): Performed by: INTERNAL MEDICINE

## 2025-07-13 PROCEDURE — 85730 THROMBOPLASTIN TIME PARTIAL: CPT

## 2025-07-13 PROCEDURE — 83735 ASSAY OF MAGNESIUM: CPT

## 2025-07-13 PROCEDURE — 85025 COMPLETE CBC W/AUTO DIFF WBC: CPT

## 2025-07-13 PROCEDURE — 84100 ASSAY OF PHOSPHORUS: CPT

## 2025-07-13 PROCEDURE — 80053 COMPREHEN METABOLIC PANEL: CPT

## 2025-07-13 RX ORDER — POTASSIUM CHLORIDE 1500 MG/1
20 TABLET, EXTENDED RELEASE ORAL ONCE
Status: COMPLETED | OUTPATIENT
Start: 2025-07-13 | End: 2025-07-13

## 2025-07-13 RX ADMIN — SODIUM CHLORIDE, PRESERVATIVE FREE 10 ML: 5 INJECTION INTRAVENOUS at 07:53

## 2025-07-13 RX ADMIN — ASPIRIN 81 MG CHEWABLE TABLET 81 MG: 81 TABLET CHEWABLE at 07:53

## 2025-07-13 RX ADMIN — PHENOBARBITAL SODIUM 65 MG: 65 INJECTION INTRAMUSCULAR; INTRAVENOUS at 04:12

## 2025-07-13 RX ADMIN — CARVEDILOL 25 MG: 25 TABLET, FILM COATED ORAL at 07:53

## 2025-07-13 RX ADMIN — LEVETIRACETAM 500 MG: 100 INJECTION INTRAVENOUS at 07:53

## 2025-07-13 RX ADMIN — LEVETIRACETAM 500 MG: 100 INJECTION INTRAVENOUS at 21:03

## 2025-07-13 RX ADMIN — PHENOBARBITAL SODIUM 32.5 MG: 65 INJECTION INTRAMUSCULAR; INTRAVENOUS at 09:21

## 2025-07-13 RX ADMIN — CARVEDILOL 25 MG: 25 TABLET, FILM COATED ORAL at 16:39

## 2025-07-13 RX ADMIN — POTASSIUM CHLORIDE 20 MEQ: 1500 TABLET, EXTENDED RELEASE ORAL at 06:38

## 2025-07-13 RX ADMIN — TICAGRELOR 90 MG: 90 TABLET ORAL at 09:20

## 2025-07-13 RX ADMIN — SODIUM CHLORIDE, PRESERVATIVE FREE 10 ML: 5 INJECTION INTRAVENOUS at 21:04

## 2025-07-13 RX ADMIN — ROSUVASTATIN 40 MG: 20 TABLET, FILM COATED ORAL at 21:03

## 2025-07-13 RX ADMIN — FOLIC ACID 1 MG: 1 TABLET ORAL at 07:53

## 2025-07-13 RX ADMIN — LOSARTAN POTASSIUM 25 MG: 25 TABLET, FILM COATED ORAL at 09:18

## 2025-07-13 RX ADMIN — POTASSIUM CHLORIDE 20 MEQ: 1500 TABLET, EXTENDED RELEASE ORAL at 11:18

## 2025-07-13 RX ADMIN — TICAGRELOR 90 MG: 90 TABLET ORAL at 21:04

## 2025-07-13 RX ADMIN — Medication 10 MG: at 21:04

## 2025-07-13 RX ADMIN — THIAMINE HYDROCHLORIDE 100 MG: 100 INJECTION, SOLUTION INTRAMUSCULAR; INTRAVENOUS at 07:53

## 2025-07-13 ASSESSMENT — PAIN SCALES - GENERAL: PAINLEVEL_OUTOF10: 0

## 2025-07-13 NOTE — PLAN OF CARE
Problem: Safety - Adult  Goal: Free from fall injury  7/12/2025 2250 by Stephanie Hansen RN  Outcome: Progressing  Flowsheets (Taken 7/12/2025 2000)  Free From Fall Injury:   Instruct family/caregiver on patient safety   Based on caregiver fall risk screen, instruct family/caregiver to ask for assistance with transferring infant if caregiver noted to have fall risk factors     Problem: Discharge Planning  Goal: Discharge to home or other facility with appropriate resources  7/12/2025 2250 by Stephanie Hansen RN  Outcome: Progressing  Flowsheets (Taken 7/12/2025 2000)  Discharge to home or other facility with appropriate resources:   Identify barriers to discharge with patient and caregiver   Arrange for needed discharge resources and transportation as appropriate   Identify discharge learning needs (meds, wound care, etc)     Problem: ABCDS Injury Assessment  Goal: Absence of physical injury  7/12/2025 2250 by Stephanie Hansen RN  Outcome: Progressing  Flowsheets (Taken 7/12/2025 2000)  Absence of Physical Injury: Implement safety measures based on patient assessment    Problem: Pain  Goal: Verbalizes/displays adequate comfort level or baseline comfort level  7/12/2025 2250 by Stephanie Hansen RN  Outcome: Progressing  Flowsheets (Taken 7/12/2025 2000)  Verbalizes/displays adequate comfort level or baseline comfort level:   Encourage patient to monitor pain and request assistance   Assess pain using appropriate pain scale   Administer analgesics based on type and severity of pain and evaluate response   Implement non-pharmacological measures as appropriate and evaluate response     Problem: Respiratory - Adult  Goal: Achieves optimal ventilation and oxygenation  7/12/2025 2250 by Stephanie Hansen RN  Outcome: Progressing  Flowsheets (Taken 7/12/2025 2000)  Achieves optimal ventilation and oxygenation:   Assess for changes in respiratory status   Assess for changes in mentation and behavior   Position to facilitate oxygenation

## 2025-07-13 NOTE — PLAN OF CARE
Problem: Discharge Planning  Goal: Discharge to home or other facility with appropriate resources  Outcome: Progressing     Problem: Safety - Adult  Goal: Free from fall injury  Outcome: Progressing     Problem: ABCDS Injury Assessment  Goal: Absence of physical injury  Outcome: Progressing     Problem: Pain  Goal: Verbalizes/displays adequate comfort level or baseline comfort level  Outcome: Progressing     Problem: Respiratory - Adult  Goal: Achieves optimal ventilation and oxygenation  Outcome: Progressing  Flowsheets (Taken 7/13/2025 0800)  Achieves optimal ventilation and oxygenation:   Assess for changes in mentation and behavior   Oxygen supplementation based on oxygen saturation or arterial blood gases   Assess and instruct to report shortness of breath or any respiratory difficulty     Problem: Skin/Tissue Integrity  Goal: Skin integrity remains intact  Description: 1.  Monitor for areas of redness and/or skin breakdown  2.  Assess vascular access sites hourly  3.  Every 4-6 hours minimum:  Change oxygen saturation probe site  4.  Every 4-6 hours:  If on nasal continuous positive airway pressure, respiratory therapy assess nares and determine need for appliance change or resting period  Outcome: Progressing  Flowsheets (Taken 7/13/2025 0800)  Skin Integrity Remains Intact: Assess vascular access sites hourly     Problem: Neurosensory - Adult  Goal: Achieves stable or improved neurological status  Outcome: Progressing  Flowsheets (Taken 7/13/2025 0800)  Achieves stable or improved neurological status: Assess for and report changes in neurological status  Goal: Absence of seizures  Outcome: Progressing  Flowsheets (Taken 7/13/2025 0800)  Absence of seizures: Monitor for seizure activity.  If seizure occurs, document type and location of movements and any associated apnea  Goal: Achieves maximal functionality and self care  Outcome: Progressing  Flowsheets (Taken 7/13/2025 0800)  Achieves maximal

## 2025-07-14 ENCOUNTER — APPOINTMENT (OUTPATIENT)
Dept: GENERAL RADIOLOGY | Age: 55
DRG: 321 | End: 2025-07-14
Payer: COMMERCIAL

## 2025-07-14 LAB
ABO + RH BLD: NORMAL
ACTIVATED CLOTTING TIME, LOW RANGE: 218 SEC
ACTIVATED CLOTTING TIME, LOW RANGE: 271 SEC
ACTIVATED CLOTTING TIME, LOW RANGE: 289 SEC
ACTIVATED CLOTTING TIME, LOW RANGE: 291 SEC
ACTIVATED CLOTTING TIME, LOW RANGE: 342 SEC
ARM BAND NUMBER: NORMAL
BLOOD BANK SAMPLE EXPIRATION: NORMAL
BLOOD GROUP ANTIBODIES SERPL: NEGATIVE
ECHO BSA: 1.92 M2

## 2025-07-14 PROCEDURE — 027035Z DILATION OF CORONARY ARTERY, ONE ARTERY WITH TWO DRUG-ELUTING INTRALUMINAL DEVICES, PERCUTANEOUS APPROACH: ICD-10-PCS | Performed by: INTERNAL MEDICINE

## 2025-07-14 PROCEDURE — 4A023N7 MEASUREMENT OF CARDIAC SAMPLING AND PRESSURE, LEFT HEART, PERCUTANEOUS APPROACH: ICD-10-PCS | Performed by: INTERNAL MEDICINE

## 2025-07-14 PROCEDURE — 99152 MOD SED SAME PHYS/QHP 5/>YRS: CPT | Performed by: INTERNAL MEDICINE

## 2025-07-14 PROCEDURE — 6370000000 HC RX 637 (ALT 250 FOR IP): Performed by: INTERNAL MEDICINE

## 2025-07-14 PROCEDURE — 6360000002 HC RX W HCPCS: Performed by: INTERNAL MEDICINE

## 2025-07-14 PROCEDURE — 86850 RBC ANTIBODY SCREEN: CPT

## 2025-07-14 PROCEDURE — 85347 COAGULATION TIME ACTIVATED: CPT

## 2025-07-14 PROCEDURE — B2101ZZ FLUOROSCOPY OF SINGLE CORONARY ARTERY USING LOW OSMOLAR CONTRAST: ICD-10-PCS | Performed by: INTERNAL MEDICINE

## 2025-07-14 PROCEDURE — 86900 BLOOD TYPING SEROLOGIC ABO: CPT

## 2025-07-14 PROCEDURE — 2140000000 HC CCU INTERMEDIATE R&B

## 2025-07-14 PROCEDURE — 86901 BLOOD TYPING SEROLOGIC RH(D): CPT

## 2025-07-14 PROCEDURE — 2709999900 HC NON-CHARGEABLE SUPPLY: Performed by: INTERNAL MEDICINE

## 2025-07-14 PROCEDURE — 92928 PRQ TCAT PLMT NTRAC ST 1 LES: CPT | Performed by: INTERNAL MEDICINE

## 2025-07-14 PROCEDURE — C1725 CATH, TRANSLUMIN NON-LASER: HCPCS | Performed by: INTERNAL MEDICINE

## 2025-07-14 PROCEDURE — 93458 L HRT ARTERY/VENTRICLE ANGIO: CPT | Performed by: INTERNAL MEDICINE

## 2025-07-14 PROCEDURE — C1894 INTRO/SHEATH, NON-LASER: HCPCS | Performed by: INTERNAL MEDICINE

## 2025-07-14 PROCEDURE — 2500000003 HC RX 250 WO HCPCS: Performed by: INTERNAL MEDICINE

## 2025-07-14 PROCEDURE — 97535 SELF CARE MNGMENT TRAINING: CPT

## 2025-07-14 PROCEDURE — C1874 STENT, COATED/COV W/DEL SYS: HCPCS | Performed by: INTERNAL MEDICINE

## 2025-07-14 PROCEDURE — 97530 THERAPEUTIC ACTIVITIES: CPT

## 2025-07-14 PROCEDURE — 6370000000 HC RX 637 (ALT 250 FOR IP): Performed by: NURSE PRACTITIONER

## 2025-07-14 PROCEDURE — 6360000004 HC RX CONTRAST MEDICATION: Performed by: INTERNAL MEDICINE

## 2025-07-14 PROCEDURE — C1887 CATHETER, GUIDING: HCPCS | Performed by: INTERNAL MEDICINE

## 2025-07-14 PROCEDURE — 71045 X-RAY EXAM CHEST 1 VIEW: CPT

## 2025-07-14 PROCEDURE — 2580000003 HC RX 258: Performed by: INTERNAL MEDICINE

## 2025-07-14 PROCEDURE — C1769 GUIDE WIRE: HCPCS | Performed by: INTERNAL MEDICINE

## 2025-07-14 DEVICE — STENT ONYXNG20012UX ONYX 2.00X12RX
Type: IMPLANTABLE DEVICE | Site: CORONARY | Status: FUNCTIONAL
Brand: ONYX FRONTIER™

## 2025-07-14 DEVICE — STENT ONYXNG20008UX ONYX 2.00X08RX
Type: IMPLANTABLE DEVICE | Site: CORONARY | Status: FUNCTIONAL
Brand: ONYX FRONTIER™

## 2025-07-14 RX ORDER — ACETAMINOPHEN 325 MG/1
650 TABLET ORAL EVERY 4 HOURS PRN
Status: DISCONTINUED | OUTPATIENT
Start: 2025-07-14 | End: 2025-07-14 | Stop reason: SDUPTHER

## 2025-07-14 RX ORDER — MIDAZOLAM HYDROCHLORIDE 1 MG/ML
INJECTION, SOLUTION INTRAMUSCULAR; INTRAVENOUS PRN
Status: DISCONTINUED | OUTPATIENT
Start: 2025-07-14 | End: 2025-07-14 | Stop reason: HOSPADM

## 2025-07-14 RX ORDER — IOPAMIDOL 755 MG/ML
INJECTION, SOLUTION INTRAVASCULAR PRN
Status: DISCONTINUED | OUTPATIENT
Start: 2025-07-14 | End: 2025-07-14 | Stop reason: HOSPADM

## 2025-07-14 RX ORDER — HEPARIN SODIUM 1000 [USP'U]/ML
INJECTION, SOLUTION INTRAVENOUS; SUBCUTANEOUS PRN
Status: DISCONTINUED | OUTPATIENT
Start: 2025-07-14 | End: 2025-07-14 | Stop reason: HOSPADM

## 2025-07-14 RX ORDER — OXYCODONE AND ACETAMINOPHEN 5; 325 MG/1; MG/1
1 TABLET ORAL EVERY 4 HOURS PRN
Refills: 0 | Status: DISCONTINUED | OUTPATIENT
Start: 2025-07-14 | End: 2025-07-16 | Stop reason: HOSPADM

## 2025-07-14 RX ORDER — SODIUM CHLORIDE 9 MG/ML
INJECTION, SOLUTION INTRAVENOUS CONTINUOUS PRN
Status: DISCONTINUED | OUTPATIENT
Start: 2025-07-14 | End: 2025-07-14 | Stop reason: HOSPADM

## 2025-07-14 RX ORDER — VERAPAMIL HYDROCHLORIDE 2.5 MG/ML
INJECTION INTRAVENOUS PRN
Status: DISCONTINUED | OUTPATIENT
Start: 2025-07-14 | End: 2025-07-14 | Stop reason: HOSPADM

## 2025-07-14 RX ORDER — SODIUM CHLORIDE 9 MG/ML
INJECTION, SOLUTION INTRAVENOUS CONTINUOUS
Status: ACTIVE | OUTPATIENT
Start: 2025-07-14 | End: 2025-07-14

## 2025-07-14 RX ORDER — NITROGLYCERIN 20 MG/100ML
INJECTION INTRAVENOUS CONTINUOUS PRN
Status: COMPLETED | OUTPATIENT
Start: 2025-07-14 | End: 2025-07-14

## 2025-07-14 RX ORDER — FENTANYL CITRATE 50 UG/ML
INJECTION, SOLUTION INTRAMUSCULAR; INTRAVENOUS PRN
Status: DISCONTINUED | OUTPATIENT
Start: 2025-07-14 | End: 2025-07-14 | Stop reason: HOSPADM

## 2025-07-14 RX ADMIN — CARVEDILOL 25 MG: 25 TABLET, FILM COATED ORAL at 16:34

## 2025-07-14 RX ADMIN — SODIUM CHLORIDE, PRESERVATIVE FREE 10 ML: 5 INJECTION INTRAVENOUS at 09:49

## 2025-07-14 RX ADMIN — CARVEDILOL 25 MG: 25 TABLET, FILM COATED ORAL at 09:48

## 2025-07-14 RX ADMIN — Medication 10 MG: at 22:09

## 2025-07-14 RX ADMIN — SODIUM CHLORIDE, PRESERVATIVE FREE 10 ML: 5 INJECTION INTRAVENOUS at 22:09

## 2025-07-14 RX ADMIN — FOLIC ACID 1 MG: 1 TABLET ORAL at 09:48

## 2025-07-14 RX ADMIN — TICAGRELOR 90 MG: 90 TABLET ORAL at 22:09

## 2025-07-14 RX ADMIN — SODIUM CHLORIDE: 0.9 INJECTION, SOLUTION INTRAVENOUS at 10:00

## 2025-07-14 RX ADMIN — ASPIRIN 81 MG CHEWABLE TABLET 81 MG: 81 TABLET CHEWABLE at 07:31

## 2025-07-14 RX ADMIN — THIAMINE HYDROCHLORIDE 100 MG: 100 INJECTION, SOLUTION INTRAMUSCULAR; INTRAVENOUS at 09:48

## 2025-07-14 RX ADMIN — LEVETIRACETAM 500 MG: 100 INJECTION INTRAVENOUS at 22:09

## 2025-07-14 RX ADMIN — LEVETIRACETAM 500 MG: 100 INJECTION INTRAVENOUS at 09:48

## 2025-07-14 RX ADMIN — LOSARTAN POTASSIUM 25 MG: 25 TABLET, FILM COATED ORAL at 09:47

## 2025-07-14 RX ADMIN — TICAGRELOR 90 MG: 90 TABLET ORAL at 07:32

## 2025-07-14 RX ADMIN — ROSUVASTATIN 40 MG: 20 TABLET, FILM COATED ORAL at 22:09

## 2025-07-14 ASSESSMENT — PAIN SCALES - GENERAL
PAINLEVEL_OUTOF10: 0
PAINLEVEL_OUTOF10: 0

## 2025-07-14 NOTE — PATIENT CARE CONFERENCE
P Quality Flow/Interdisciplinary Rounds Progress Note        Quality Flow Rounds held on July 14, 2025    Disciplines Attending:  Bedside Nurse, , , and Nursing Unit Leadership    Tadeo Santana was admitted on 7/7/2025 12:28 PM    Anticipated Discharge Date:       Disposition:    Dean Score:  Dean Scale Score: 20    [facility] RISK OF UNPLANNED READMISSION 2.0             7.9 Total Score        Discussed patient goal for the day, patient clinical progression, and barriers to discharge.  The following Goal(s) of the Day/Commitment(s) have been identified:  Labs - Report Results and have no bleeding from cath site      Eunice Flores RN  July 14, 2025

## 2025-07-14 NOTE — CARE COORDINATION
7/14/25  Transition of Care update.  Patient is being followed for STEMI and Acute respiratory failure. Patient planned for cardiac cath today. Patient noted to have hypoxic encephalopathy.  Patient is on Keppra with Neuorsurgery and ID following. Therapy evaultions reveal AM-PAC of 15/24 for PT and 14/24 for OT.  Spoke with patient and family who are requesting a skilled rehab stay at [facility].  Referral placed via [facility] and pending. Discharge goal is expected to be a [facility] stay. DREW/RAHEEM to follow.    Electronically signed by MARKUS Layne on 7/14/2025 at 4:17 PM

## 2025-07-14 NOTE — PLAN OF CARE
Problem: Discharge Planning  Goal: Discharge to home or other facility with appropriate resources  7/13/2025 2318 by Pati Napier RN  Outcome: Progressing     Problem: Safety - Adult  Goal: Free from fall injury  7/13/2025 2318 by Pati Napier RN  Outcome: Progressing     Problem: ABCDS Injury Assessment  Goal: Absence of physical injury  7/13/2025 2318 by Pati Napier RN  Outcome: Progressing     Problem: Pain  Goal: Verbalizes/displays adequate comfort level or baseline comfort level  7/13/2025 2318 by Pati Napier RN  Outcome: Progressing     Problem: Respiratory - Adult  Goal: Achieves optimal ventilation and oxygenation  7/13/2025 2318 by Pati Napier RN  Outcome: Progressing     Problem: Skin/Tissue Integrity - Adult  Goal: Skin integrity remains intact  Description: 1.  Monitor for areas of redness and/or skin breakdown  2.  Assess vascular access sites hourly  3.  Every 4-6 hours minimum:  Change oxygen saturation probe site  4.  Every 4-6 hours:  If on nasal continuous positive airway pressure, respiratory therapy assess nares and determine need for appliance change or resting period  7/13/2025 2318 by Pati Napier RN  Outcome: Progressing     Problem: Skin/Tissue Integrity  Goal: Skin integrity remains intact  Description: 1.  Monitor for areas of redness and/or skin breakdown  2.  Assess vascular access sites hourly  3.  Every 4-6 hours minimum:  Change oxygen saturation probe site  4.  Every 4-6 hours:  If on nasal continuous positive airway pressure, respiratory therapy assess nares and determine need for appliance change or resting period  7/13/2025 2318 by Pati Napier RN  Outcome: Progressing     Problem: Seizure Precautions  Goal: Remains free of injury related to seizures activity  7/13/2025 2318 by Pati Napier RN  Outcome: Progressing     Problem: Nutrition Deficit:  Goal: Optimize nutritional status  7/13/2025 2318 by Karthikeyan

## 2025-07-14 NOTE — CONSULTS
Cardiology critical care consult note    Reason for consultation:  We are asked to see Mr. Santana in consultation by Dr. Templeton regarding a STEMI and out-of-hospital cardiac arrest.    History of chief complaint:  This is a 54-year-old gentleman who I evaluated during his transport to the CAT scan unit and on his arrival to the CAT scan unit.  He was intubated, sedated, and on a ventilator.  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  Bystander CPR was performed.  Reportedly EMS found him to have VT fib.  He was defibrillated.  Reportedly he was not breathing well on arrival and therefore he was intubated on arrival.  Reportedly he hit his head with his syncope.  No family is present at the bedside.    Labs: Pending    ECG: Sinus tachycardia, 112 bpm.  Inferior STEMI.    Physical exam:  General: Intubated and sedated  Eyes: Closed.  Mouth: Blood.  Intubated.  Heart: Distant.  Regular tachycardic rhythm.  Lungs: Clear to auscultation bilaterally while on the ventilator.  Abdomen: Soft  Extremities: No voluntary movements.  No edema.  Good distal pulses.  Skin: Warm, dry, intact.  Neuro: No voluntary movements.  No tremors.    Assessment:  Out-of-hospital cardiac arrest.  Head trauma.  Inferior STEMI.    Recommendations:  He was given heparin in the field  Stat cardiac catheterization and possible PCI if his CAT scan of his head is negative for bleeding and he is cleared for anticoagulation and dual antiplatelet therapy.  Supportive care.    32 minutes critical care time.    Addendum:  Delayed door to balloon time.  Awaiting results for CT of the head for clearance to proceed with cardiac catheterization.  
Critical Care Consult Note    Patient - Tadeo Santana   MRN -  [id number]   Acct # - [id number]   - 1970      Date of Admission -  2025 12:28 PM  Date of evaluation -  2025  4403/4403-A   Hospital Day - 0          ADMIT/CONSULT DETAILS     Reason for Admit/Consult   CPA with ROSC  STEMI s/p [facility] MAGAN to RCA  Acute hypoxemic respiratory failure on vent  Anoxic encephalopathy      Consulting Service/Physician   Consulting - Wen Adamson MD  Primary Care Physician - Carolin León P, DO         HPI   The patient is a 54 y.o. 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  Per EMS, patient was found to be in V-FIB arrest , defibrillated with ROSC obtained, and was given narcan for pinpoint pupil.  EKG showed concerned for  STEMI , was given 4,000 units of heparin in field. Patient did hit his head. Upon ED workup, sbps 190s, , Lab work showed BUN of 14, creatinine 1.2, lactic of 4.6, troponin of 50, , , WBC 14.8, hemoglobin 16.5, hematocrit 47.5, platelets 248, Patient is intubated and placed on mechanical ventilator. CT head showed small poorly defined focus of cortical to subcortical increased density inferior left frontal lobe measure up to 7.6mm AP suggesting blood in could represent a small contusion or perhaps diffuse anoxal injury. Neurosurgery consulted, recommended MRI of the brain. MRI of the brain showed motion degraded exam. 7mm focus of heterogenous signal within the inferior left frontal lobe in the region of the hyperdensity noted, susceptibility weighted images, this may represent a cavernoma. Cardiology was notified regards to finding. Patient was emergently taken to cath lab. [facility] s/p PCI with aspiration thrombectomy and stenting with MAGAN to Distal RCA. Patient was admitted to ICU and critical care consulted.     Patient admitted to MICU, intubated and 
Met with patient and discussed that their physician has ordered a referral to our outpatient Phase II Cardiac Rehabilitation program. Reviewed the benefits of cardiac rehabilitation based on their diagnosis and personal risk factors. Patient demonstrates strong interest in Cardiac Rehabilitation at this time.  Cardiac Rehabilitation brochure provided to patient/family. The Cardiac Rehabilitation Program has been provided the patient's referral information and pertinent patient details and history. The patient may call [facility] Cardiac Rehabilitation at [phone] for additional information or questions. Contact information for [facility] Cardiac Rehabilitation and other choices close to the patient's residence have been provided in the discharge instructions so that the patient may call and schedule an appointment when cleared by their physician. Thank you for the referral.  
Met with patient and discussed that their physician has ordered a referral to our outpatient Phase II Cardiac Rehabilitation program. Reviewed the benefits of cardiac rehabilitation based on their diagnosis and personal risk factors. Patient demonstrates strong interest in Cardiac Rehabilitation at this time.  Cardiac Rehabilitation brochure provided to patient/family. The Cardiac Rehabilitation Program has been provided the patient's referral information and pertinent patient details and history. The patient may call [facility] Cardiac Rehabilitation at [phone] for additional information or questions. Contact information for [facility] Cardiac Rehabilitation and other choices close to the patient's residence have been provided in the discharge instructions so that the patient may call and schedule an appointment when cleared by their physician. Thank you for the referral.  
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 Per EMS, patient was found to be in V-FIB arrest , defibrillated with ROSC obtained, and was given narcan for pinpoint pupil. EKG showed concerned for STEMI , was given 4,000 units of heparin in field. Patient did hit his head. Upon ED workup, sbps 190s, , Lab work showed BUN of 14, creatinine 1.2, lactic of 4.6, troponin of 50, , , WBC 14.8, hemoglobin 16.5, hematocrit 47.5, platelets 248, Patient is intubated and placed on mechanical ventilator. CT head showed small poorly defined focus of cortical to subcortical increased density inferior left frontal lobe measure up to 7.6mm AP suggesting blood in could represent a small contusion or perhaps diffuse anoxal injury. Neurosurgery consulted, recommended MRI of the brain. MRI of the brain showed motion degraded exam. 7mm focus of heterogenous signal within the inferior left frontal lobe in the region of the hyperdensity noted, susceptibility weighted images, this may represent a cavernoma. Cardiology was notified regards to finding. Patient was emergently taken to cath lab. [facility] s/p PCI with aspiration thrombectomy and stenting with MAGAN to Distal RCA. Patient was admitted to ICU and critical care consulted.   .    No past medical history on file.  Past Surgical History:   Procedure 
babinski sign,   Positive doll's eye reflex,   pupils constricted but responsive to light  Negative Guzmán's sign  gag reflex testing deferred d/t sedation      Mental Status: Unable to assess, patient is sedated        Motor: deferred dt patient mental status        Sensory:deferred dt patient mental status        Coordination: deferred dt patient mental status        Gait: deferred dt patient mental status        Laboratory/Radiology:  ry/Radiology:     CBC with Differential:    Lab Results   Component Value Date/Time    WBC 10.4 07/08/2025 04:01 AM    RBC 4.76 07/08/2025 04:01 AM    HGB 15.9 07/08/2025 04:01 AM    HCT 44.9 07/08/2025 04:01 AM     07/08/2025 04:01 AM    MCV 94.3 07/08/2025 04:01 AM    MCH 33.4 07/08/2025 04:01 AM    MCHC 35.4 07/08/2025 04:01 AM    RDW 13.5 07/08/2025 04:01 AM    LYMPHOPCT 11 07/08/2025 04:01 AM    MONOPCT 6 07/08/2025 04:01 AM    EOSPCT 0 07/08/2025 04:01 AM    BASOPCT 0 07/08/2025 04:01 AM    MONOSABS 0.63 07/08/2025 04:01 AM    LYMPHSABS 1.10 07/08/2025 04:01 AM    EOSABS 0.00 07/08/2025 04:01 AM    BASOSABS 0.01 07/08/2025 04:01 AM     CMP:    Lab Results   Component Value Date/Time     07/08/2025 04:01 AM    K 4.4 07/08/2025 04:01 AM     07/08/2025 04:01 AM    CO2 18 07/08/2025 04:01 AM    BUN 15 07/08/2025 04:01 AM    CREATININE 1.1 07/08/2025 04:01 AM    LABGLOM 82 07/08/2025 04:01 AM    GLUCOSE 140 07/08/2025 04:01 AM    CALCIUM 8.9 07/08/2025 04:01 AM    BILITOT 0.5 07/08/2025 04:01 AM    ALKPHOS 72 07/08/2025 04:01 AM     07/08/2025 04:01 AM     07/08/2025 04:01 AM     Hepatic Function Panel:    Lab Results   Component Value Date/Time    ALKPHOS 72 07/08/2025 04:01 AM     07/08/2025 04:01 AM     07/08/2025 04:01 AM    BILITOT 0.5 07/08/2025 04:01 AM     PT/INR:    Lab Results   Component Value Date/Time    PROTIME 12.3 07/08/2025 04:01 AM    INR 1.1 07/08/2025 04:01 AM     PTT:    Lab Results   Component Value Date/Time

## 2025-07-14 NOTE — DISCHARGE INSTRUCTIONS
Cardiac Rehabilitation: Discharge instructions        Cardiac rehabilitation is a program for people who have a heart problem, such as a heart attack, coronary stent placed, heart failure, or a heart valve disease. The program includes exercise, lifestyle changes, education, and emotional support. Cardiac rehab can help you improve the quality of your life through better overall health. It can help you lose weight and feel better about yourself.    On your cardiac rehab team, you may have your doctor, a nurse specialist, an exercise physiologist, and a dietitian. They will design your cardiac rehab program specifically for you. You will learn how to reduce your risk for heart problems, how to manage stress, and how to eat a heart-healthy diet. By the end of the program, you will be ready to maintain a healthier lifestyle on your own.    Follow-up care is a key part of your treatment and safety. Be sure to make and go to all appointments, and call your doctor if you are having problems. It's also a good idea to know your test results and keep a list of the medicines you take.    Please call to schedule your first appointment once you have been cleared by your cardiologist to attend Phase II Outpatient Cardiac Rehabilitation.       Cardiac Rehabilitation Options:  [facility]             [facility]  [address]                                                                                          Cardiology Services  [place], [place] [address]                                                                              [address]  P- [phone]                                                                                         [place] [address]  Hours: M/W/F 7am-7pm & T/Th 7am-12pm                                                  [phone]                                                                                   
Statement Selected

## 2025-07-14 NOTE — PLAN OF CARE
Problem: Discharge Planning  Goal: Discharge to home or other facility with appropriate resources  7/14/2025 1125 by Stephanie Freeman RN  Outcome: Progressing     Problem: Safety - Adult  Goal: Free from fall injury  7/14/2025 1125 by Stephanie Freeman RN  Outcome: Progressing     Problem: ABCDS Injury Assessment  Goal: Absence of physical injury  7/14/2025 1125 by Stephanie Freeman RN  Outcome: Progressing     Problem: Pain  Goal: Verbalizes/displays adequate comfort level or baseline comfort level  7/14/2025 1125 by Stephanie Freeman RN  Outcome: Progressing     Problem: Respiratory - Adult  Goal: Achieves optimal ventilation and oxygenation  7/14/2025 1125 by Stephanie Freeman RN  Outcome: Progressing     Problem: Skin/Tissue Integrity  Goal: Skin integrity remains intact  Description: 1.  Monitor for areas of redness and/or skin breakdown  2.  Assess vascular access sites hourly  3.  Every 4-6 hours minimum:  Change oxygen saturation probe site  4.  Every 4-6 hours:  If on nasal continuous positive airway pressure, respiratory therapy assess nares and determine need for appliance change or resting period  7/14/2025 1125 by Stephanie Freeman RN  Outcome: Progressing     Problem: Neurosensory - Adult  Goal: Achieves stable or improved neurological status  Outcome: Progressing     Problem: Neurosensory - Adult  Goal: Absence of seizures  Outcome: Progressing     Problem: Neurosensory - Adult  Goal: Achieves maximal functionality and self care  Outcome: Progressing     Problem: Cardiovascular - Adult  Goal: Maintains optimal cardiac output and hemodynamic stability  Outcome: Progressing     Problem: Cardiovascular - Adult  Goal: Absence of cardiac dysrhythmias or at baseline  Outcome: Progressing     Problem: Skin/Tissue Integrity - Adult  Goal: Skin integrity remains intact  Description: 1.  Monitor for areas of redness and/or skin breakdown  2.  Assess vascular access sites hourly  3.  Every 4-6 hours minimum:  Change oxygen

## 2025-07-15 LAB
ANION GAP SERPL CALCULATED.3IONS-SCNC: 11 MMOL/L (ref 7–16)
BUN SERPL-MCNC: 11 MG/DL (ref 6–20)
CALCIUM SERPL-MCNC: 8.9 MG/DL (ref 8.6–10)
CHLORIDE SERPL-SCNC: 102 MMOL/L (ref 98–107)
CO2 SERPL-SCNC: 23 MMOL/L (ref 22–29)
CREAT SERPL-MCNC: 0.8 MG/DL (ref 0.7–1.2)
EKG ATRIAL RATE: 64 BPM
EKG P AXIS: 87 DEGREES
EKG P-R INTERVAL: 146 MS
EKG Q-T INTERVAL: 436 MS
EKG QRS DURATION: 84 MS
EKG QTC CALCULATION (BAZETT): 449 MS
EKG R AXIS: 81 DEGREES
EKG T AXIS: -56 DEGREES
EKG VENTRICULAR RATE: 64 BPM
GFR, ESTIMATED: >90 ML/MIN/1.73M2
GLUCOSE SERPL-MCNC: 114 MG/DL (ref 74–99)
POTASSIUM SERPL-SCNC: 4.3 MMOL/L (ref 3.5–5.1)
SODIUM SERPL-SCNC: 136 MMOL/L (ref 136–145)

## 2025-07-15 PROCEDURE — 80048 BASIC METABOLIC PNL TOTAL CA: CPT

## 2025-07-15 PROCEDURE — 6360000002 HC RX W HCPCS: Performed by: INTERNAL MEDICINE

## 2025-07-15 PROCEDURE — 6370000000 HC RX 637 (ALT 250 FOR IP): Performed by: INTERNAL MEDICINE

## 2025-07-15 PROCEDURE — 97535 SELF CARE MNGMENT TRAINING: CPT

## 2025-07-15 PROCEDURE — 99232 SBSQ HOSP IP/OBS MODERATE 35: CPT | Performed by: INTERNAL MEDICINE

## 2025-07-15 PROCEDURE — 97130 THER IVNTJ EA ADDL 15 MIN: CPT

## 2025-07-15 PROCEDURE — 97530 THERAPEUTIC ACTIVITIES: CPT

## 2025-07-15 PROCEDURE — 2500000003 HC RX 250 WO HCPCS: Performed by: INTERNAL MEDICINE

## 2025-07-15 PROCEDURE — 36415 COLL VENOUS BLD VENIPUNCTURE: CPT

## 2025-07-15 PROCEDURE — 97129 THER IVNTJ 1ST 15 MIN: CPT

## 2025-07-15 PROCEDURE — 2140000000 HC CCU INTERMEDIATE R&B

## 2025-07-15 RX ORDER — ASPIRIN 81 MG/1
81 TABLET ORAL DAILY
Status: DISCONTINUED | OUTPATIENT
Start: 2025-07-16 | End: 2025-07-16 | Stop reason: HOSPADM

## 2025-07-15 RX ADMIN — SODIUM CHLORIDE, PRESERVATIVE FREE 10 ML: 5 INJECTION INTRAVENOUS at 08:15

## 2025-07-15 RX ADMIN — Medication 10 MG: at 21:25

## 2025-07-15 RX ADMIN — THIAMINE HYDROCHLORIDE 100 MG: 100 INJECTION, SOLUTION INTRAMUSCULAR; INTRAVENOUS at 08:13

## 2025-07-15 RX ADMIN — FOLIC ACID 1 MG: 1 TABLET ORAL at 08:13

## 2025-07-15 RX ADMIN — LEVETIRACETAM 500 MG: 100 INJECTION INTRAVENOUS at 08:13

## 2025-07-15 RX ADMIN — TICAGRELOR 90 MG: 90 TABLET ORAL at 08:13

## 2025-07-15 RX ADMIN — ENOXAPARIN SODIUM 40 MG: 100 INJECTION SUBCUTANEOUS at 08:14

## 2025-07-15 RX ADMIN — CARVEDILOL 25 MG: 25 TABLET, FILM COATED ORAL at 17:53

## 2025-07-15 RX ADMIN — TICAGRELOR 90 MG: 90 TABLET ORAL at 21:25

## 2025-07-15 RX ADMIN — CARVEDILOL 25 MG: 25 TABLET, FILM COATED ORAL at 08:14

## 2025-07-15 RX ADMIN — SODIUM CHLORIDE, PRESERVATIVE FREE 10 ML: 5 INJECTION INTRAVENOUS at 21:25

## 2025-07-15 RX ADMIN — ASPIRIN 81 MG CHEWABLE TABLET 81 MG: 81 TABLET CHEWABLE at 08:13

## 2025-07-15 RX ADMIN — LOSARTAN POTASSIUM 25 MG: 25 TABLET, FILM COATED ORAL at 08:13

## 2025-07-15 RX ADMIN — LEVETIRACETAM 500 MG: 100 INJECTION INTRAVENOUS at 21:25

## 2025-07-15 RX ADMIN — ROSUVASTATIN 40 MG: 20 TABLET, FILM COATED ORAL at 21:25

## 2025-07-15 ASSESSMENT — PAIN SCALES - GENERAL
PAINLEVEL_OUTOF10: 0

## 2025-07-15 NOTE — PLAN OF CARE
Problem: Discharge Planning  Goal: Discharge to home or other facility with appropriate resources  7/15/2025 0912 by Stephanie Freeman RN  Outcome: Progressing     Problem: Safety - Adult  Goal: Free from fall injury  7/15/2025 0912 by Stephanie Freeman RN  Outcome: Progressing     Problem: ABCDS Injury Assessment  Goal: Absence of physical injury  7/15/2025 0912 by Stephanie Freeman RN  Outcome: Progressing     Problem: Pain  Goal: Verbalizes/displays adequate comfort level or baseline comfort level  7/15/2025 0912 by Stephanie Freeman RN  Outcome: Progressing     Problem: Respiratory - Adult  Goal: Achieves optimal ventilation and oxygenation  Outcome: Progressing     Problem: Cardiovascular - Adult  Goal: Absence of cardiac dysrhythmias or at baseline  Outcome: Progressing     Problem: Infection - Adult  Goal: Absence of infection at discharge  Outcome: Progressing     Problem: Metabolic/Fluid and Electrolytes - Adult  Goal: Hemodynamic stability and optimal renal function maintained  Outcome: Progressing

## 2025-07-15 NOTE — CARE COORDINATION
Transition of care update: S/p MAGAN x 2 to prox OM1 on 07/14. IV keppra 500mg q 12 hrs. Lab results from today noted. Met with pt and pt's wife, Hero, in room. Physical therapy update from 07/14 am-pac 18/24 and pt ambulated 310 ft with no AD SBA. Occupational therapy update from 07/14 am-pac 19/24. Pt would like to go home with [facility] instead of sondra stay at [facility]. Their choice for [facility] is [place]. Referral was made via [facility]. Hero will be returning to work and said pt's mother and sister in law will be available to help with pt after discharge. They would like pt/ot to work with pt again today. Cm/sw will follow.

## 2025-07-15 NOTE — PATIENT CARE CONFERENCE
P Quality Flow/Interdisciplinary Rounds Progress Note        Quality Flow Rounds held on July 15, 2025    Disciplines Attending:  Bedside Nurse, , , and Nursing Unit Leadership    Tadeo Santana was admitted on 7/7/2025 12:28 PM    Anticipated Discharge Date:       Disposition:    Dean Score:  Dean Scale Score: 20    [facility] RISK OF UNPLANNED READMISSION 2.0             7.9 Total Score        Discussed patient goal for the day, patient clinical progression, and barriers to discharge.  The following Goal(s) of the Day/Commitment(s) have been identified:  downgrade/discharge planning      Lyudmila Lopez RN  July 15, 2025

## 2025-07-15 NOTE — PLAN OF CARE
Problem: Discharge Planning  Goal: Discharge to home or other facility with appropriate resources  7/14/2025 2327 by Pati Napier RN  Outcome: Progressing     Problem: Safety - Adult  Goal: Free from fall injury  7/14/2025 2327 by Pati Napier RN  Outcome: Progressing     Problem: ABCDS Injury Assessment  Goal: Absence of physical injury  7/14/2025 2327 by Pati Napier RN  Outcome: Progressing     Problem: Pain  Goal: Verbalizes/displays adequate comfort level or baseline comfort level  7/14/2025 2327 by Pati Napier RN  Outcome: Progressing     Problem: Skin/Tissue Integrity - Adult  Goal: Skin integrity remains intact  Description: 1.  Monitor for areas of redness and/or skin breakdown  2.  Assess vascular access sites hourly  3.  Every 4-6 hours minimum:  Change oxygen saturation probe site  4.  Every 4-6 hours:  If on nasal continuous positive airway pressure, respiratory therapy assess nares and determine need for appliance change or resting period  7/14/2025 2327 by Pati Napier RN  Outcome: Progressing     Problem: Skin/Tissue Integrity  Goal: Skin integrity remains intact  Description: 1.  Monitor for areas of redness and/or skin breakdown  2.  Assess vascular access sites hourly  3.  Every 4-6 hours minimum:  Change oxygen saturation probe site  4.  Every 4-6 hours:  If on nasal continuous positive airway pressure, respiratory therapy assess nares and determine need for appliance change or resting period  7/14/2025 2327 by Pati Napier RN  Outcome: Progressing     Problem: Seizure Precautions  Goal: Remains free of injury related to seizures activity  7/14/2025 2327 by Pati Napier RN  Outcome: Progressing     Problem: Nutrition Deficit:  Goal: Optimize nutritional status  7/14/2025 2327 by Pati Napier RN  Outcome: Progressing      Quality 110: Preventive Care And Screening: Influenza Immunization: Influenza Immunization not Administered because Patient Refused. Quality 402: Tobacco Use And Help With Quitting Among Adolescents: Patient screened for tobacco and never smoked Detail Level: Detailed Quality 226: Preventive Care And Screening: Tobacco Use: Screening And Cessation Intervention: Patient screened for tobacco use and is an ex/non-smoker Quality 111:Pneumonia Vaccination Status For Older Adults: Pneumococcal Vaccination Previously Received

## 2025-07-16 VITALS
HEART RATE: 61 BPM | TEMPERATURE: 97.9 F | BODY MASS INDEX: 24.29 KG/M2 | WEIGHT: 169.7 LBS | OXYGEN SATURATION: 98 % | DIASTOLIC BLOOD PRESSURE: 74 MMHG | SYSTOLIC BLOOD PRESSURE: 101 MMHG | RESPIRATION RATE: 16 BRPM | HEIGHT: 70 IN

## 2025-07-16 LAB — ACTIVATED CLOTTING TIME, LOW RANGE: 170 SEC

## 2025-07-16 PROCEDURE — 97530 THERAPEUTIC ACTIVITIES: CPT

## 2025-07-16 PROCEDURE — 97129 THER IVNTJ 1ST 15 MIN: CPT

## 2025-07-16 PROCEDURE — 2500000003 HC RX 250 WO HCPCS: Performed by: INTERNAL MEDICINE

## 2025-07-16 PROCEDURE — 6370000000 HC RX 637 (ALT 250 FOR IP): Performed by: INTERNAL MEDICINE

## 2025-07-16 PROCEDURE — 97130 THER IVNTJ EA ADDL 15 MIN: CPT

## 2025-07-16 PROCEDURE — 97535 SELF CARE MNGMENT TRAINING: CPT

## 2025-07-16 PROCEDURE — 6360000002 HC RX W HCPCS: Performed by: INTERNAL MEDICINE

## 2025-07-16 RX ORDER — LOSARTAN POTASSIUM 25 MG/1
25 TABLET ORAL DAILY
Qty: 30 TABLET | Refills: 3 | Status: SHIPPED | OUTPATIENT
Start: 2025-07-17

## 2025-07-16 RX ORDER — TICAGRELOR 90 MG/1
90 TABLET, FILM COATED ORAL 2 TIMES DAILY
Qty: 60 TABLET | Refills: 3 | Status: SHIPPED | OUTPATIENT
Start: 2025-07-16

## 2025-07-16 RX ORDER — FOLIC ACID 1 MG/1
1 TABLET ORAL DAILY
Qty: 30 TABLET | Refills: 3 | Status: SHIPPED | OUTPATIENT
Start: 2025-07-17

## 2025-07-16 RX ORDER — CARVEDILOL 25 MG/1
25 TABLET ORAL 2 TIMES DAILY WITH MEALS
Qty: 60 TABLET | Refills: 3 | Status: SHIPPED | OUTPATIENT
Start: 2025-07-16

## 2025-07-16 RX ORDER — LEVETIRACETAM 500 MG/1
500 TABLET ORAL 2 TIMES DAILY
Qty: 60 TABLET | Refills: 3 | Status: SHIPPED | OUTPATIENT
Start: 2025-07-16

## 2025-07-16 RX ORDER — ASPIRIN 81 MG/1
81 TABLET ORAL DAILY
Qty: 30 TABLET | Refills: 3 | Status: SHIPPED | OUTPATIENT
Start: 2025-07-17

## 2025-07-16 RX ORDER — ROSUVASTATIN CALCIUM 40 MG/1
40 TABLET, COATED ORAL NIGHTLY
Qty: 30 TABLET | Refills: 3 | Status: SHIPPED | OUTPATIENT
Start: 2025-07-16

## 2025-07-16 RX ADMIN — LOSARTAN POTASSIUM 25 MG: 25 TABLET, FILM COATED ORAL at 09:27

## 2025-07-16 RX ADMIN — THIAMINE HYDROCHLORIDE 100 MG: 100 INJECTION, SOLUTION INTRAMUSCULAR; INTRAVENOUS at 09:28

## 2025-07-16 RX ADMIN — ENOXAPARIN SODIUM 40 MG: 100 INJECTION SUBCUTANEOUS at 09:27

## 2025-07-16 RX ADMIN — CARVEDILOL 25 MG: 25 TABLET, FILM COATED ORAL at 09:27

## 2025-07-16 RX ADMIN — SODIUM CHLORIDE, PRESERVATIVE FREE 10 ML: 5 INJECTION INTRAVENOUS at 09:29

## 2025-07-16 RX ADMIN — FOLIC ACID 1 MG: 1 TABLET ORAL at 09:41

## 2025-07-16 RX ADMIN — TICAGRELOR 90 MG: 90 TABLET ORAL at 09:28

## 2025-07-16 RX ADMIN — ASPIRIN 81 MG: 81 TABLET, COATED ORAL at 09:27

## 2025-07-16 RX ADMIN — LEVETIRACETAM 500 MG: 100 INJECTION INTRAVENOUS at 09:29

## 2025-07-16 NOTE — PATIENT CARE CONFERENCE
P Quality Flow/Interdisciplinary Rounds Progress Note        Quality Flow Rounds held on July 16, 2025    Disciplines Attending:  Bedside Nurse, , , and Nursing Unit Leadership    Tadeo Santana was admitted on 7/7/2025 12:28 PM    Anticipated Discharge Date:       Disposition:    Dean Score:  Dean Scale Score: 20    [facility] RISK OF UNPLANNED READMISSION 2.0             8 Total Score        Discussed patient goal for the day, patient clinical progression, and barriers to discharge.  The following Goal(s) of the Day/Commitment(s) have been identified:  report labs/diagnostics      Lyudmila Lopez RN  July 16, 2025

## 2025-07-16 NOTE — CARE COORDINATION
Transition of care update: Discharge order on chart. Met with pt and pt's mother in room earlier today. Plan remains to home. Informed them [facility] is setup and will see pt after discharge. [facility] can provide speech therapy at home. [facility] orders on chart. Will message [facility] to inform them of dc today via [facility]. No other needs were voiced for home. Family will transport.

## 2025-07-16 NOTE — PROGRESS NOTES
[facility]             Pulmonary & Critical Care Medicine                MICU Progress Note                 Written by: Manav Downing MD  Name: Tadeo Santana : 1970       Age: 54 y.o. MR/Act #    : [id number],  Billing  #    : [id number]   Admit Date: 2025 12:28 PM LOS: 5,   Hospital Day: 6 Room #      : 4403/4403-A   PCP            : Carolin León DO,   Referred by: Lida Lu MD ICU Attending: MD Kalli Comer date: 2025 10:58 AM   ICU Days:      5 Vent Days:     0 LOS: 5,                          Reason for ICU admission           Chief Complaint   Patient presents with    Chest Pain     Found unresponsive.  CPR administered.  EKG showed STEMI.                          Brief HPI, Presentation & Synopsis                       54 y.o. 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  Per EMS, patient was found to be in V-FIB arrest , defibrillated with ROSC obtained, and was given narcan for pinpoint pupil.  EKG showed concerned for  STEMI , was given 4,000 units of heparin in field. Patient did hit his head. Upon ED workup, sbps 190s, , Lab work showed BUN of 14, creatinine 1.2, lactic of 4.6, troponin of 50, , , WBC 14.8, hemoglobin 16.5, hematocrit 47.5, platelets 248, Patient is intubated and placed on mechanical ventilator. CT head showed small poorly defined focus of cortical to subcortical increased density inferior left frontal lobe measure up to 7.6mm AP suggesting blood in could represent a small contusion or perhaps diffuse anoxal injury. Neurosurgery consulted, recommended MRI of the brain. MRI of the brain showed motion degraded exam. 7mm focus of heterogenous signal within the inferior left frontal lobe in the region of the hyperdensity noted, susceptibility weighted images, this may represent 
       [facility]             Pulmonary & Critical Care Medicine                MICU Progress Note                 Written by: Manav Downing MD  Name: Tadeo Santana : 1970       Age: 54 y.o. MR/Act #    : [id number],  Billing  #    : [id number]   Admit Date: 2025 12:28 PM LOS: 1,   Hospital Day: 2 Room #      : 4403/4403-A   PCP            : Carolin León DO,   Referred by: Bradley Bender DO ICU Attending: MD Kalli Comer date: 2025 10:53 AM   ICU Days:       {2 Vent Days:     2 LOS: 1,                          Reason for ICU admission           Chief Complaint   Patient presents with    Chest Pain     Found unresponsive.  CPR administered.  EKG showed STEMI.                          Brief HPI, Presentation & Synopsis                       54 y.o. 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  Per EMS, patient was found to be in V-FIB arrest , defibrillated with ROSC obtained, and was given narcan for pinpoint pupil.  EKG showed concerned for  STEMI , was given 4,000 units of heparin in field. Patient did hit his head. Upon ED workup, sbps 190s, , Lab work showed BUN of 14, creatinine 1.2, lactic of 4.6, troponin of 50, , , WBC 14.8, hemoglobin 16.5, hematocrit 47.5, platelets 248, Patient is intubated and placed on mechanical ventilator. CT head showed small poorly defined focus of cortical to subcortical increased density inferior left frontal lobe measure up to 7.6mm AP suggesting blood in could represent a small contusion or perhaps diffuse anoxal injury. Neurosurgery consulted, recommended MRI of the brain. MRI of the brain showed motion degraded exam. 7mm focus of heterogenous signal within the inferior left frontal lobe in the region of the hyperdensity noted, susceptibility weighted images, this may 
       [facility]             Pulmonary & Critical Care Medicine                MICU Progress Note                 Written by: Manav Downing MD  Name: Tadeo Santana : 1970       Age: 54 y.o. MR/Act #    : [id number],  Billing  #    : [id number]   Admit Date: 2025 12:28 PM LOS: 3,   Hospital Day: 4 Room #      : 4403/4403-A   PCP            : Carolin León DO,   Referred by: Bradley Bender DO ICU Attending: MD Kalli Comer date: 7/10/2025 1:36 PM   ICU Days:      4 Vent Days:     0 LOS: 3,                          Reason for ICU admission           Chief Complaint   Patient presents with    Chest Pain     Found unresponsive.  CPR administered.  EKG showed STEMI.                          Brief HPI, Presentation & Synopsis                       54 y.o. 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  Per EMS, patient was found to be in V-FIB arrest , defibrillated with ROSC obtained, and was given narcan for pinpoint pupil.  EKG showed concerned for  STEMI , was given 4,000 units of heparin in field. Patient did hit his head. Upon ED workup, sbps 190s, , Lab work showed BUN of 14, creatinine 1.2, lactic of 4.6, troponin of 50, , , WBC 14.8, hemoglobin 16.5, hematocrit 47.5, platelets 248, Patient is intubated and placed on mechanical ventilator. CT head showed small poorly defined focus of cortical to subcortical increased density inferior left frontal lobe measure up to 7.6mm AP suggesting blood in could represent a small contusion or perhaps diffuse anoxal injury. Neurosurgery consulted, recommended MRI of the brain. MRI of the brain showed motion degraded exam. 7mm focus of heterogenous signal within the inferior left frontal lobe in the region of the hyperdensity noted, susceptibility weighted images, this may 
      Hospitalist Progress Note      PCP: Carolin León DO    Date of Admission: 7/7/2025        Hospital Course:  HISTORY, HAD AN OUT OF HOSPITAL CARDIA ARREST.  HE IS SEDATED AND INTUBATED, NO FAMILY PRESENT.  FOUND TO HAVE AN INFERIOR STEMI.   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  Per EMS, patient was found to be in V-fib arrest, defibrillated once and ROSC was achieved.  Patient's pupils were apparently pinpoint in the field and received Narcan for this.    Per wife, patient has a history of seizures otherwise takes no other medications, is a smoker.              Subjective:  UP IN CHAIR NO COMPLAINTS, STILL CONFUSED            Medications:  Reviewed    Infusion Medications    sodium chloride Stopped (07/09/25 1659)    sodium chloride       Scheduled Medications    ipratropium 0.5 mg-albuterol 2.5 mg  1 Dose Inhalation TID RT    potassium phosphate IVPB (PERIPHERAL LINE)  15 mmol IntraVENous Once    carvedilol  25 mg Oral BID WC    thiamine  100 mg IntraVENous Daily    folic acid  1 mg Oral Daily    PHENobarbital  130 mg IntraVENous Q6H    Followed by    [START ON 7/12/2025] PHENobarbital  65 mg IntraVENous Q6H    Followed by    [START ON 7/13/2025] PHENobarbital  32.5 mg IntraVENous Q12H    Followed by    [START ON 7/14/2025] PHENobarbital  16.2 mg IntraVENous Q12H    melatonin  10 mg Oral Nightly    enoxaparin  40 mg SubCUTAneous Daily    ticagrelor  90 mg Oral BID    rosuvastatin  40 mg Oral Nightly    losartan  25 mg Oral Daily    aspirin  81 mg Oral Daily    sodium chloride flush  5-40 mL IntraVENous 2 times per day    levETIRAcetam  500 mg IntraVENous Q12H     PRN Meds: ipratropium 0.5 mg-albuterol 2.5 mg, PHENobarbital **FOLLOWED BY** [START ON 7/12/2025] PHENobarbital **FOLLOWED BY** [START ON 7/14/2025] PHENobarbital, benzocaine-menthol, phenol, oxyCODONE-acetaminophen, sodium chloride flush, 
      Hospitalist Progress Note      PCP: Carolin León DO    Date of Admission: 7/7/2025        Hospital Course:  HISTORY, HAD AN OUT OF HOSPITAL CARDIA ARREST.  HE IS SEDATED AND INTUBATED, NO FAMILY PRESENT.  FOUND TO HAVE AN INFERIOR STEMI.   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  Per EMS, patient was found to be in V-fib arrest, defibrillated once and ROSC was achieved.  Patient's pupils were apparently pinpoint in the field and received Narcan for this.    Per wife, patient has a history of seizures otherwise takes no other medications, is a smoker.           Subjective:  EXTUBATED, FAMILY AT BEDSIDE           Medications:  Reviewed    Infusion Medications    sodium chloride 100 mL/hr at 07/09/25 0645    propofol 20 mcg/kg/min (07/09/25 0934)    midazolam 4 mg/hr (07/09/25 0645)    sodium chloride       Scheduled Medications    carvedilol  6.25 mg Oral BID WC    enoxaparin  40 mg SubCUTAneous Daily    ticagrelor  90 mg Oral BID    rosuvastatin  40 mg Oral Nightly    losartan  25 mg Oral Daily    aspirin  81 mg Oral Daily    chlorhexidine  15 mL Mouth/Throat BID    ipratropium 0.5 mg-albuterol 2.5 mg  1 Dose Inhalation Q4H RT    pantoprazole (PROTONIX) 40 mg in sodium chloride (PF) 0.9 % 10 mL injection  40 mg IntraVENous Daily    sodium chloride flush  5-40 mL IntraVENous 2 times per day    levETIRAcetam  500 mg IntraVENous Q12H     PRN Meds: midazolam, acetaminophen, oxyCODONE-acetaminophen, sodium chloride flush, sodium chloride, potassium chloride **OR** potassium chloride, magnesium sulfate, ondansetron **OR** ondansetron, polyethylene glycol, acetaminophen **OR** acetaminophen      Intake/Output Summary (Last 24 hours) at 7/9/2025 1233  Last data filed at 7/9/2025 1135  Gross per 24 hour   Intake 3096.2 ml   Output 1300 ml   Net 1796.2 ml       Exam:    /81   Pulse 91   Temp 99.1 °F (37.3 °C) 
      Hospitalist Progress Note      PCP: Carolin León DO    Date of Admission: 7/7/2025        Hospital Course:  HISTORY, HAD AN OUT OF HOSPITAL CARDIA ARREST.  HE IS SEDATED AND INTUBATED, NO FAMILY PRESENT.  FOUND TO HAVE AN INFERIOR STEMI.   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  Per EMS, patient was found to be in V-fib arrest, defibrillated once and ROSC was achieved.  Patient's pupils were apparently pinpoint in the field and received Narcan for this.    Per wife, patient has a history of seizures otherwise takes no other medications, is a smoker.           Subjective: UP IN CHAIR, STILL HAS SOME CONFUSION           Medications:  Reviewed    Infusion Medications    sodium chloride Stopped (07/09/25 1659)    sodium chloride       Scheduled Medications    carvedilol  12.5 mg Oral BID WC    enoxaparin  40 mg SubCUTAneous Daily    ticagrelor  90 mg Oral BID    rosuvastatin  40 mg Oral Nightly    losartan  25 mg Oral Daily    aspirin  81 mg Oral Daily    ipratropium 0.5 mg-albuterol 2.5 mg  1 Dose Inhalation Q4H RT    sodium chloride flush  5-40 mL IntraVENous 2 times per day    levETIRAcetam  500 mg IntraVENous Q12H     PRN Meds: benzocaine-menthol, phenol, oxyCODONE-acetaminophen, sodium chloride flush, sodium chloride, potassium chloride **OR** potassium chloride, magnesium sulfate, ondansetron **OR** ondansetron, polyethylene glycol, acetaminophen **OR** acetaminophen      Intake/Output Summary (Last 24 hours) at 7/10/2025 1302  Last data filed at 7/10/2025 1055  Gross per 24 hour   Intake 72.68 ml   Output 2400 ml   Net -2327.32 ml       Exam:    BP (!) 150/106   Pulse 83   Temp 97.9 °F (36.6 °C) (Oral)   Resp 15   Ht 1.778 m (5' 10\")   Wt 76 kg (167 lb 8.8 oz)   SpO2 94%   BMI 24.04 kg/m²       General appearance:  No apparent distress, appears stated age.  HEENT:  Normal cephalic, atraumatic 
    [facility]                                Progress note    Subjective:    The patient is awake and alert.  Lying in bed without complaints.  No acute events overnight.    Denies chest pain, angina, SOB     Objective:   Patient seen in bed S/P heart cath.     /73   Pulse 70   Temp 98.8 °F (37.1 °C) (Oral)   Resp 16   Ht 1.778 m (5' 10\")   Wt 73.5 kg (162 lb 0.6 oz)   SpO2 98%   BMI 23.25 kg/m²     In: 1200 [P.O.:1200]  Out: 615   In: 1200   Out: 615 [Urine:600]    General appearance: NAD, conversant, pleasant   HEENT: AT/NC, MMM  Neck: FROM, supple  Lungs: Clear to auscultation  CV: RRR, no MRGs  Vasc: right wrist wrapped S/P heart cath. Ecchymosis noted at cath site.   Abdomen: Soft, non-tender; no masses or HSM  Extremities: No peripheral edema or digital cyanosis  Skin: no rash, lesions or ulcers  Psych: Alert and oriented to person, place and time  Neuro: Alert and interactive     Recent Labs     07/12/25  0410 07/13/25  0422   WBC 5.7 6.3   HGB 14.0 13.1   HCT 39.9 37.7    245       Recent Labs     07/12/25  0410 07/13/25  0422   * 136   K 3.5 3.8    103   CO2 22 22   BUN 16 14   CREATININE 1.0 0.9   CALCIUM 8.8 8.7       Assessment:    Principal Problem:    Acute myocardial infarction (HCC)  Active Problems:    Hypoxic encephalopathy (HCC)    Myoclonus    Anoxic brain injury (HCC)  Resolved Problems:    * No resolved hospital problems. *      Plan: Patient is a 54 yr old male admitted for cardiac arrest, STEMI.     07/14/2025  Vital signs stable  Heart cath with Staged PCI, stenting of the proximal OM with 2 MAGAN today  Plan for cardiac rehab outpatient  AM-PAC score 19/24   Neurosurgery following for [place]- on [place]  CXR improving -slight pulmonary venous congestion  Discharge when stable   Appreciate all consultants input      Code status: FULL  Consultants:  Cardiology,Neurosurgery    DVT Prophylaxis   PT/OT  Discharge planning         Maria Fernanda Vasquez 
   07/07/25 1954   Patient Observation   Pulse 86   Respirations 19   SpO2 98 %   Vent Information   Vent Mode AC/VC   Ventilator Settings   Vt (Set, mL) 450 mL   Resp Rate (Set) 20 bpm   PEEP/CPAP (cmH2O) 8   FiO2  100 %   Vent Patient Data (Readings)   Vt (Measured) 477 mL   Peak Inspiratory Pressure (cmH2O) 26 cmH2O   Rate Measured 19 br/min   Minute Volume (L/min) 9.21 Liters   Peak Inspiratory Flow (lpm) 60 L/sec   Mean Airway Pressure (cmH2O) 13 cmH20   Plateau Pressure (cm H2O) 18 cm H2O   Driving Pressure 10   I:E Ratio 1:2.90   Flow Sensitivity 3 L/min   Static Compliance (L/cm H2O) 49   Backup Apnea On   Backup Rate 20 Breaths Per Minute   Backup Vt 450   Vent Alarm Settings   High Pressure (cmH2O) 40 cmH2O   Low Minute Volume (lpm) 7 L/min   High Minute Volume (lpm) 16 L/min   Low Exhaled Vt (ml) 350 mL   High Exhaled Vt (ml) 800 mL   RR High (bpm) 30 br/min   Apnea (secs) 20 secs   Additional Respiratoray Assessments   Humidification Source Heated wire   Humidification Temp 37   Circuit Condensation Not drained   Ambu Bag With Mask At Bedside Yes   ETT    Placement Date/Time: 07/07/25 1220   Present on Admission/Arrival: No  Placed By: Licensed provider  Placement Verified By: Direct visualization  Preoxygenation: No  Technique: Direct laryngoscopy  Airway Type: Cuffed  Airway Tube Size: 7.5 mm  Laryng...   Secured At 24 cm   Measured From Lips   Secured By Commercial tube hayes   Site Assessment Dry       
   07/07/25 2128   Patient Observation   Pulse 80   Respirations 20   SpO2 99 %   Vent Information   Ventilator Day(s) 1   Ventilator -70   Vent Mode AC/VC   Ventilator Settings   Vt (Set, mL) 450 mL   Resp Rate (Set) 20 bpm   PEEP/CPAP (cmH2O) 8   FiO2  (S)  50 %   Vent Patient Data (Readings)   Vt (Measured) 501 mL   Peak Inspiratory Pressure (cmH2O) 23 cmH2O   Rate Measured 20 br/min   Minute Volume (L/min) 9.61 Liters   Mean Airway Pressure (cmH2O) 13 cmH20   Plateau Pressure (cm H2O) 19 cm H2O   Driving Pressure 11   I:E Ratio 1:2.90   Static Compliance (L/cm H2O) 46   Backup Apnea On   Backup Rate 20 Breaths Per Minute   Backup Vt 450   Vent Alarm Settings   High Pressure (cmH2O) 45 cmH2O   Low Minute Volume (lpm) (S)  6 L/min   High Minute Volume (lpm) 16 L/min   Low Exhaled Vt (ml) 350 mL   High Exhaled Vt (ml) 800 mL   RR High (bpm) 30 br/min   Apnea (secs) 20 secs   Additional Respiratoray Assessments   Humidification Source Heated wire   Humidification Temp 37   Circuit Condensation Not drained   Ambu Bag With Mask At Bedside Yes   ETT    Placement Date/Time: 07/07/25 1220   Present on Admission/Arrival: No  Placed By: Licensed provider  Placement Verified By: Direct visualization  Preoxygenation: No  Technique: Direct laryngoscopy  Airway Type: Cuffed  Airway Tube Size: 7.5 mm  Laryng...   Secured At 24 cm   Measured From Lips   Secured By Commercial tube hayes   Ventilator Associated Pneumonia Bundle   Elevation of Head of Bed to 30-45 Degrees  Yes     Weaned to 50%  
   07/08/25 0058   Patient Observation   Pulse 77   Respirations 19   SpO2 99 %   Vent Information   Ventilator Day(s) 2   Ventilator -70   Vent Mode AC/VC   $Ventilation $Subsequent Day   Ventilator Settings   Vt (Set, mL) 450 mL   Resp Rate (Set) 20 bpm   PEEP/CPAP (cmH2O) 8   FiO2  (S)  40 %   Vent Patient Data (Readings)   Vt (Measured) 462 mL   Peak Inspiratory Pressure (cmH2O) 22 cmH2O   Rate Measured 19 br/min   Minute Volume (L/min) 8.95 Liters   Mean Airway Pressure (cmH2O) 12 cmH20   Plateau Pressure (cm H2O) 18 cm H2O   Driving Pressure 10   I:E Ratio 1:2.90   Static Compliance (L/cm H2O) 47   Backup Apnea On   Vent Alarm Settings   High Pressure (cmH2O) 45 cmH2O   Low Minute Volume (lpm) 6 L/min   High Minute Volume (lpm) 16 L/min   Low Exhaled Vt (ml) 350 mL   High Exhaled Vt (ml) 800 mL   RR High (bpm) 30 br/min   Apnea (secs) 20 secs   Additional Respiratoray Assessments   Humidification Source Heated wire   Humidification Temp 37   Circuit Condensation Drained   Ambu Bag With Mask At Bedside Yes   Airway Clearance   Suction ET Tube   ETT    Placement Date/Time: 07/07/25 1220   Present on Admission/Arrival: No  Placed By: Licensed provider  Placement Verified By: Direct visualization  Preoxygenation: No  Technique: Direct laryngoscopy  Airway Type: Cuffed  Airway Tube Size: 7.5 mm  Laryng...   Secured At 24 cm  (7.5)   Measured From Lips   ETT Placement Left   Secured By Commercial tube hayes   Ventilator Associated Pneumonia Bundle   Elevation of Head of Bed to 30-45 Degrees  Yes     Patient weaned to 40%  
   07/09/25 1039   Oxygen Therapy/Pulse Ox   O2 Therapy (S)  Oxygen   $Oxygen $Daily Charge   O2 Device (S)  Nasal cannula   O2 Flow Rate (L/min) (S)  3 L/min   Pulse 87   Respirations 20   SpO2 98 %       
   PROGRESS NOTE     CARDIOLOGY    Chief complaint: Seen today for follow up, management & recommendations for STEMI, cardiac arrest    He denies chest pain or shortness of breath.    Wt Readings from Last 3 Encounters:   07/13/25 74.7 kg (164 lb 10.9 oz)   07/07/25 74.8 kg (165 lb)   06/22/18 74.8 kg (165 lb)     Temp Readings from Last 3 Encounters:   07/13/25 98.7 °F (37.1 °C) (Oral)   06/22/18 98.8 °F (37.1 °C) (Oral)     BP Readings from Last 3 Encounters:   07/13/25 108/66   06/22/18 (!) 148/82     Pulse Readings from Last 3 Encounters:   07/13/25 65   06/22/18 68         Intake/Output Summary (Last 24 hours) at 7/13/2025 0957  Last data filed at 7/13/2025 0400  Gross per 24 hour   Intake 240 ml   Output 1525 ml   Net -1285 ml       Recent Labs     07/11/25  0504 07/12/25  0410 07/13/25  0422   WBC 8.5 5.7 6.3   HGB 14.1 14.0 13.1   HCT 39.6 39.9 37.7   MCV 93.2 94.5 94.0    237 245     Recent Labs     07/11/25  0504 07/12/25  0410 07/13/25  0422    135* 136   K 3.5 3.5 3.8    102 103   CO2 24 22 22   PHOS 2.6 3.5 3.2   BUN 13 16 14   CREATININE 0.9 1.0 0.9   MG 2.2 2.1 2.2     Recent Labs     07/11/25  0504 07/12/25  0410 07/13/25  0422   PROTIME 12.4 12.3 12.0   INR 1.1 1.1 1.1     No results for input(s): \"CKTOTAL\", \"CKMB\", \"CKMBINDEX\", \"TROPONINI\" in the last 72 hours.    No results for input(s): \"BNP\" in the last 72 hours.  No results for input(s): \"CHOL\", \"HDL\", \"TRIG\" in the last 72 hours.    Invalid input(s): \"CHOLHDLR\", \"LDLCALCU\"  No results for input(s): \"TROPHS\" in the last 72 hours.        ipratropium 0.5 mg-albuterol 2.5 mg (DUONEB) nebulizer solution 1 Dose, Q4H PRN  carvedilol (COREG) tablet 25 mg, BID WC  thiamine (B-1) injection 100 mg, Daily  folic acid (FOLVITE) tablet 1 mg, Daily  PHENobarbital (LUMINAL) injection 32.5 mg, Q6H PRN   Followed by  [START ON 7/14/2025] PHENobarbital (LUMINAL) injection 16.2 mg, Q6H PRN  PHENobarbital (LUMINAL) injection 65 mg, Q6H   
   PROGRESS NOTE     CARDIOLOGY    Chief complaint: Seen today for follow up, management & recommendations for STEMI, cardiac arrest    He denies chest pain or shortness of breath.  Sitting in bed watching TV.  Comfortable and in no distress    Wt Readings from Last 3 Encounters:   07/15/25 77 kg (169 lb 11.2 oz)   07/07/25 74.8 kg (165 lb)   06/22/18 74.8 kg (165 lb)     Temp Readings from Last 3 Encounters:   07/15/25 98.2 °F (36.8 °C) (Temporal)   06/22/18 98.8 °F (37.1 °C) (Oral)     BP Readings from Last 3 Encounters:   07/15/25 112/76   06/22/18 (!) 148/82     Pulse Readings from Last 3 Encounters:   07/15/25 64   06/22/18 68         Intake/Output Summary (Last 24 hours) at 7/15/2025 1955  Last data filed at 7/15/2025 1342  Gross per 24 hour   Intake 1560 ml   Output 1725 ml   Net -165 ml       Recent Labs     07/13/25  0422   WBC 6.3   HGB 13.1   HCT 37.7   MCV 94.0        Recent Labs     07/13/25  0422 07/15/25  0532    136   K 3.8 4.3    102   CO2 22 23   PHOS 3.2  --    BUN 14 11   CREATININE 0.9 0.8   MG 2.2  --      Recent Labs     07/13/25  0422   PROTIME 12.0   INR 1.1     No results for input(s): \"CKTOTAL\", \"CKMB\", \"CKMBINDEX\", \"TROPONINI\" in the last 72 hours.    No results for input(s): \"BNP\" in the last 72 hours.  No results for input(s): \"CHOL\", \"HDL\", \"TRIG\" in the last 72 hours.    Invalid input(s): \"CHOLHDLR\", \"LDLCALCU\"  No results for input(s): \"TROPHS\" in the last 72 hours.        [START ON 7/16/2025] aspirin EC tablet 81 mg, Daily  oxyCODONE-acetaminophen (PERCOCET) 5-325 MG per tablet 1 tablet, Q4H PRN  ipratropium 0.5 mg-albuterol 2.5 mg (DUONEB) nebulizer solution 1 Dose, Q4H PRN  carvedilol (COREG) tablet 25 mg, BID WC  thiamine (B-1) injection 100 mg, Daily  folic acid (FOLVITE) tablet 1 mg, Daily  benzocaine-menthol (CEPACOL SORE THROAT) lozenge 1 lozenge, Q2H PRN  phenol 1.4 % mouth spray 1 spray, Q2H PRN  melatonin disintegrating tablet 10 mg, Nightly  0.9 % 
   PROGRESS NOTE     CARDIOLOGY    Chief complaint: Seen today for follow up, management & recommendations for STEMI, cardiac arrest    He is extubated.  He is awake.  He is oriented x 3.  He complains of chest soreness.  No shortness of breath.    Wt Readings from Last 3 Encounters:   07/10/25 76 kg (167 lb 8.8 oz)   07/07/25 74.8 kg (165 lb)   06/22/18 74.8 kg (165 lb)     Temp Readings from Last 3 Encounters:   07/10/25 98.3 °F (36.8 °C) (Oral)   06/22/18 98.8 °F (37.1 °C) (Oral)     BP Readings from Last 3 Encounters:   07/10/25 (!) 150/92   06/22/18 (!) 148/82     Pulse Readings from Last 3 Encounters:   07/10/25 79   06/22/18 68         Intake/Output Summary (Last 24 hours) at 7/10/2025 0846  Last data filed at 7/10/2025 0800  Gross per 24 hour   Intake 386.41 ml   Output 2650 ml   Net -2263.59 ml       Recent Labs     07/08/25  0401 07/09/25  0417 07/10/25  0420   WBC 10.4 14.1* 10.6   HGB 15.9 14.1 13.7   HCT 44.9 41.2 39.8   MCV 94.3 96.5 95.7    180 173     Recent Labs     07/08/25  0401 07/09/25  0417 07/10/25  0420    136 135*   K 4.4 4.1 4.8    106 103   CO2 18* 19* 21*   PHOS 3.4 2.3* 2.6   BUN 15 13 12   CREATININE 1.1 0.8 0.7   MG 2.0 2.0 2.2     Recent Labs     07/08/25  0401 07/09/25  0417 07/10/25  0420   PROTIME 12.3 12.6* 12.6*   INR 1.1 1.2 1.1     Recent Labs     07/07/25 2018   CKTOTAL 6,602*     No results for input(s): \"BNP\" in the last 72 hours.  No results for input(s): \"CHOL\", \"HDL\", \"TRIG\" in the last 72 hours.    Invalid input(s): \"CHOLHDLR\", \"LDLCALCU\"  Recent Labs     07/07/25  2018 07/08/25  0133 07/08/25  0931   TROPHS 8,709* 9,968* >10,000*         carvedilol (COREG) tablet 6.25 mg, BID WC  0.9 % sodium chloride infusion, Continuous  enoxaparin (LOVENOX) injection 40 mg, Daily  ticagrelor (BRILINTA) tablet 90 mg, BID  rosuvastatin (CRESTOR) tablet 40 mg, Nightly  oxyCODONE-acetaminophen (PERCOCET) 5-325 MG per tablet 1 tablet, Q4H PRN  losartan (COZAAR) tablet 
   PROGRESS NOTE     CARDIOLOGY    Chief complaint: Seen today for follow up, management & recommendations for STEMI, cardiac arrest    He is extubated.  He is awake.  He is oriented x 3.  He was sitting in a chair at the side of bed.  He was comfortable and in no distress.  He denies chest pain or shortness of breath.    Wt Readings from Last 3 Encounters:   07/11/25 75.8 kg (167 lb 1.7 oz)   07/07/25 74.8 kg (165 lb)   06/22/18 74.8 kg (165 lb)     Temp Readings from Last 3 Encounters:   07/11/25 97.2 °F (36.2 °C) (Oral)   06/22/18 98.8 °F (37.1 °C) (Oral)     BP Readings from Last 3 Encounters:   07/11/25 122/81   06/22/18 (!) 148/82     Pulse Readings from Last 3 Encounters:   07/11/25 83   06/22/18 68         Intake/Output Summary (Last 24 hours) at 7/11/2025 1219  Last data filed at 7/11/2025 1000  Gross per 24 hour   Intake 200 ml   Output 2425 ml   Net -2225 ml       Recent Labs     07/09/25  0417 07/10/25  0420 07/11/25  0504   WBC 14.1* 10.6 8.5   HGB 14.1 13.7 14.1   HCT 41.2 39.8 39.6   MCV 96.5 95.7 93.2    173 211     Recent Labs     07/09/25  0417 07/10/25  0420 07/11/25  0504    135* 136   K 4.1 4.8 3.5    103 100   CO2 19* 21* 24   PHOS 2.3* 2.6 2.6   BUN 13 12 13   CREATININE 0.8 0.7 0.9   MG 2.0 2.2 2.2     Recent Labs     07/09/25  0417 07/10/25  0420 07/11/25  0504   PROTIME 12.6* 12.6* 12.4   INR 1.2 1.1 1.1     No results for input(s): \"CKTOTAL\", \"CKMB\", \"CKMBINDEX\", \"TROPONINI\" in the last 72 hours.    No results for input(s): \"BNP\" in the last 72 hours.  No results for input(s): \"CHOL\", \"HDL\", \"TRIG\" in the last 72 hours.    Invalid input(s): \"CHOLHDLR\", \"LDLCALCU\"  No results for input(s): \"TROPHS\" in the last 72 hours.        ipratropium 0.5 mg-albuterol 2.5 mg (DUONEB) nebulizer solution 1 Dose, TID RT  ipratropium 0.5 mg-albuterol 2.5 mg (DUONEB) nebulizer solution 1 Dose, Q4H PRN  potassium phosphate 15 mmol in sodium chloride 0.9 % 250 mL IVPB, Once  carvedilol 
   PROGRESS NOTE     CARDIOLOGY    Chief complaint: Seen today for follow up, management & recommendations for STEMI, cardiac arrest    He was intubated, sedated, and on a ventilator.    Wt Readings from Last 3 Encounters:   07/07/25 74.8 kg (165 lb)   07/07/25 74.8 kg (165 lb)   06/22/18 74.8 kg (165 lb)     Temp Readings from Last 3 Encounters:   07/08/25 (!) 100.7 °F (38.2 °C) (Temporal)   06/22/18 98.8 °F (37.1 °C) (Oral)     BP Readings from Last 3 Encounters:   07/08/25 103/70   06/22/18 (!) 148/82     Pulse Readings from Last 3 Encounters:   07/08/25 83   06/22/18 68         Intake/Output Summary (Last 24 hours) at 7/8/2025 1154  Last data filed at 7/8/2025 1100  Gross per 24 hour   Intake 480.33 ml   Output 2105 ml   Net -1624.67 ml       Recent Labs     07/07/25  1611 07/07/25 2018 07/08/25  0401   WBC 17.6* 14.6* 10.4   HGB 17.2* 17.0* 15.9   HCT 49.6 47.6 44.9   MCV 94.5 93.7 94.3    240 216     Recent Labs     07/07/25  1228 07/07/25 2018 07/08/25  0401    136 138   K 3.8 4.6 4.4    103 105   CO2 17* 15* 18*   PHOS  --  4.2 3.4   BUN 14 16 15   CREATININE 1.2 1.2 1.1   MG  --  2.0 2.0     Recent Labs     07/07/25  1228 07/08/25  0401   PROTIME 11.5 12.3   INR 1.1 1.1     Recent Labs     07/07/25 2018   CKTOTAL 6,602*     No results for input(s): \"BNP\" in the last 72 hours.  No results for input(s): \"CHOL\", \"HDL\", \"TRIG\" in the last 72 hours.    Invalid input(s): \"CHOLHDLR\", \"LDLCALCU\"  Recent Labs     07/07/25 2018 07/08/25  0133 07/08/25  0931   TROPHS 8,709* 9,968* >10,000*         0.9 % sodium chloride infusion, Continuous  enoxaparin (LOVENOX) injection 40 mg, Daily  propofol infusion, Continuous  midazolam (VERSED) 100mg/100mL in NS infusion, Continuous  midazolam PF (VERSED) IntraVENous 1 mg, Q30 Min PRN  acetaminophen (TYLENOL) tablet 650 mg, Q4H PRN  carvedilol (COREG) tablet 3.125 mg, BID WC  ticagrelor (BRILINTA) tablet 90 mg, BID  rosuvastatin (CRESTOR) tablet 40 mg, 
   PROGRESS NOTE     CARDIOLOGY    Chief complaint: Seen today for follow up, management & recommendations for STEMI, cardiac arrest    He was sitting in the chair at the side of the bed.  He was comfortable and in no distress.  No chest pain or shortness of breath.    Wt Readings from Last 3 Encounters:   07/12/25 74.6 kg (164 lb 7.4 oz)   07/07/25 74.8 kg (165 lb)   06/22/18 74.8 kg (165 lb)     Temp Readings from Last 3 Encounters:   07/12/25 98.7 °F (37.1 °C) (Oral)   06/22/18 98.8 °F (37.1 °C) (Oral)     BP Readings from Last 3 Encounters:   07/12/25 92/70   06/22/18 (!) 148/82     Pulse Readings from Last 3 Encounters:   07/12/25 75   06/22/18 68         Intake/Output Summary (Last 24 hours) at 7/12/2025 1243  Last data filed at 7/12/2025 1010  Gross per 24 hour   Intake 1434.17 ml   Output 1750 ml   Net -315.83 ml       Recent Labs     07/10/25  0420 07/11/25  0504 07/12/25  0410   WBC 10.6 8.5 5.7   HGB 13.7 14.1 14.0   HCT 39.8 39.6 39.9   MCV 95.7 93.2 94.5    211 237     Recent Labs     07/10/25  0420 07/11/25  0504 07/12/25  0410   * 136 135*   K 4.8 3.5 3.5    100 102   CO2 21* 24 22   PHOS 2.6 2.6 3.5   BUN 12 13 16   CREATININE 0.7 0.9 1.0   MG 2.2 2.2 2.1     Recent Labs     07/10/25  0420 07/11/25  0504 07/12/25  0410   PROTIME 12.6* 12.4 12.3   INR 1.1 1.1 1.1     No results for input(s): \"CKTOTAL\", \"CKMB\", \"CKMBINDEX\", \"TROPONINI\" in the last 72 hours.    No results for input(s): \"BNP\" in the last 72 hours.  No results for input(s): \"CHOL\", \"HDL\", \"TRIG\" in the last 72 hours.    Invalid input(s): \"CHOLHDLR\", \"LDLCALCU\"  No results for input(s): \"TROPHS\" in the last 72 hours.        ipratropium 0.5 mg-albuterol 2.5 mg (DUONEB) nebulizer solution 1 Dose, Q4H PRN  potassium chloride (KLOR-CON M) extended release tablet 40 mEq, Once  carvedilol (COREG) tablet 25 mg, BID WC  thiamine (B-1) injection 100 mg, Daily  folic acid (FOLVITE) tablet 1 mg, Daily  PHENobarbital (LUMINAL) 
 Tadeo Santana is a 54 y.o.  male     Neurology following for cardiac arrest s/p posturing    PMH of no significant health history    Assessment:     Anoxic brain injury s/p myoclonus  Patient with cardiac arrest, was found to be in V-fib arrest defibrillated with ROSC obtained  Patient with myoclonus in ER  MRI positive for anoxic encephalopathy  Maintained on Keppra with resolution of myoclonus    Plan:     Continue Keppra 500 mg twice daily  Continue to monitor off sedation  Neurology will follow    History of Present Illness:     Patient presented to the ER on 7/7/2025 after becoming unresponsive at work.  Patient's mom said patient lost consciousness and CPR was started immediately by his coworkers.  EMS was called and he was found to be pulseless and then in V-fib arrest.  CPR initiated via [facility], he was shocked once and ROSC was achieved.  Patient was pulseless for 5 minutes.  He was given Narcan by EMS for restricted pupils.    Patient was intubated for airway protection when he arrived to the ER.  He was transferred to the ICU for further evaluation    Patient has a history of seizures years ago and was weaned off his Dilantin in the early 2000's.    Neurology was consulted for myoclonus observed in the ER    Subjective:     Patient lying in bed awake, alert, and intubated with his family at the bedside.  He is able to follow simple commands at this time.    MRI results were reviewed with family, all questions answered    Objective:       /80   Pulse 84   Temp 99.8 °F (37.7 °C) (Axillary)   Resp 20   Ht 1.778 m (5' 10\")   Wt 75.5 kg (166 lb 7.2 oz)   SpO2 98%   BMI 23.88 kg/m²       General appearance: alert, appears stated age, cooperative and no distress  Head: normocephalic, without obvious abnormality, atraumatic  Eyes: conjunctivae/corneas clear  Neck: no adenopathy,  supple, symmetrical, trachea midline and thyroid not enlarged, symmetric, no tenderness/mass/nodules  Lungs: Regular 
 Tadeo Santana is a 54 y.o.  male     Neurology following for cardiac arrest s/p posturing    PMH of no significant health history    Assessment:     Anoxic brain injury s/p myoclonus  Patient with cardiac arrest, was found to be in V-fib arrest defibrillated with ROSC obtained  Patient with myoclonus in ER  MRI positive for anoxic encephalopathy  Maintained on Keppra with resolution of myoclonus    Plan:     Continue Keppra 500 mg twice daily  Okay for cardiac cath on Monday  Neurology will sign off call if needed  Follow-up with neurology after discharge  Continue seizure precautions for 6 months from date of last seizure. These include, but are not limited to:   No driving  No riding bicycles in traffic  No operating dangerous/heavy machinery  No engaging in activities at dangerous heights including using ladders  No taking baths unattended  No swimming  No engaging in activities near fire unattended  Recommend caution or avoidance of cooking or using potentially dangerous objects such as knives.  Avoid any activities where sudden loss of consciousness could result in injury to yourself or others    History of Present Illness:     Patient presented to the ER on 7/7/2025 after becoming unresponsive at work.  Patient's mom said patient lost consciousness and CPR was started immediately by his coworkers.  EMS was called and he was found to be pulseless and then in V-fib arrest.  CPR initiated via [facility], he was shocked once and ROSC was achieved.  Patient was pulseless for 5 minutes.  He was given Narcan by EMS for restricted pupils.    Patient was intubated for airway protection when he arrived to the ER.  He was transferred to the ICU for further evaluation    Patient has a history of seizures years ago and was weaned off his Dilantin in the early 2000's.    Neurology was consulted for myoclonus observed in the ER    Subjective:     Patient sitting up in bed eating breakfast with his family at bedside.  He was 
4 Eyes Skin Assessment     NAME:  Tadeo Santana  YOB: 1970  MEDICAL RECORD NUMBER:  [id number]    The patient is being assessed for  Transfer to New Unit    I agree that at least one RN has performed a thorough Head to Toe Skin Assessment on the patient. ALL assessment sites listed below have been assessed.      Areas assessed by both nurses:    Head, Face, Ears, Shoulders, Back, Chest, Arms, Elbows, Hands, Sacrum. Buttock, Coccyx, Ischium, Legs. Feet and Heels, Under Medical Devices , and Other , general        Does the Patient have a Wound? No noted wound(s)       Dean Prevention initiated by RN: No  Wound Care Orders initiated by RN: No    Pressure Injury (Stage 1,2,3,4, Unstageable, DTI, NWPT, and Complex wounds) if present, place Wound referral order by RN under : No    New Ostomies, if present place, Ostomy referral order under : No     Nurse 1 eSignature: Electronically signed by Tiesha Carnes RN on 7/13/25 at 12:07 PM EDT    **SHARE this note so that the co-signing nurse can place an eSignature**    Nurse 2 eSignature: Electronically signed by VIKTORIA Carnes RN on 7/13/25 at 12:10 PM EDT  
4 Eyes Skin Assessment     NAME:  Tadeo Santana  YOB: 1970  MEDICAL RECORD NUMBER:  [id number]    The patient is being assessed for  Admission    I agree that at least one RN has performed a thorough Head to Toe Skin Assessment on the patient. ALL assessment sites listed below have been assessed.      Areas assessed by both nurses:    Head, Face, Ears, Shoulders, Back, Chest, Arms, Elbows, Hands, Sacrum. Buttock, Coccyx, Ischium, Legs. Feet and Heels, and Under Medical Devices         Does the Patient have a Wound? No noted wound(s)       Psoriasis patches- generalized    Dean Prevention initiated by RN: Yes  Wound Care Orders initiated by RN: No    Pressure Injury (Stage 3,4, Unstageable, DTI, NWPT, and Complex wounds) if present, place Wound referral order by RN under : No    New Ostomies, if present place, Ostomy referral order under : No     Nurse 1 eSignature: Electronically signed by VINAYAK ESTEVEZ RN on 7/7/25 at 7:40 PM EDT    **SHARE this note so that the co-signing nurse can place an eSignature**    Nurse 2 eSignature: Electronically signed by Vania Butterfield RN on 7/7/25 at 6:14 PM EDT  
Ann consulted. Will follow patient's case    
Comprehensive Nutrition Assessment    Type and Reason for Visit:  Initial, LOS (ICU LOS)    Nutrition Recommendations/Plan:   Continue diet as tolerated. Recommend and start ONS: Ensure + BID to promote nutrient/PO intake.      Malnutrition Assessment:  Malnutrition Status:  Insufficient data (07/11/25 1211)    Context:  Acute Illness     Findings of the 6 clinical characteristics of malnutrition:  Energy Intake:  Mild decrease in energy intake  Weight Loss:  Unable to assess (2/2 lack of wt hx on file)     Body Fat Loss:  Unable to assess     Muscle Mass Loss:  Unable to assess    Fluid Accumulation:  No fluid accumulation     Strength:  Not Performed    Nutrition Assessment:    Pt. admitted from [facility] site after found to be unresponsive. Pt. found to be in V-fib/cardiac arrest; s/p defibrillated with ROSC obtained. S/p emergent LHC/PCI with aspiration thrombectomy and stenting. Noted Anoxic brain injury s/p myoclonus; MRI positive for anoxic encephalopathy per Neuro. PMHx of seizure. Pt. extubated on 07/09. Currently on regular diet w/ reduced intakes noted. Will start ONS and monitor.    Nutrition Related Findings:    A&Ox2, -I/O(4.2L), soft/flat abd, active BS, no edema, hyperglycemia, elevated AST/ALT, hyperglycemia, Wound Type: None       Current Nutrition Intake & Therapies:    Average Meal Intake: 1-25%  Average Supplements Intake: None Ordered  ADULT DIET; Regular; Low Fat/Low Chol/High Fiber/2 gm Na; Low Sodium (2 gm)    Anthropometric Measures:  Height: 177.8 cm (5' 10\")  Ideal Body Weight (IBW): 166 lbs (75 kg)    Admission Body Weight: 87.7 kg (193 lb 5.5 oz) (7/07 first measured no method)  Current Body Weight: 75.8 kg (167 lb 1.7 oz) (7/11 BS), 100.7 % IBW. Weight Source: Bed scale  Current BMI (kg/m2): 24  Usual Body Weight:  (UTO d/t lack of wt hx on file)        Weight Adjustment For: No Adjustment                 BMI Categories: Normal Weight (BMI 18.5-24.9)    Estimated Daily Nutrient 
DAILY VENTILATOR WEANING ASSESSMENT PERFORMED    P/FIO2 Ratio =          (<100= do not Wean)                  Cs =                          (<32= Instability)  Plat. Pressure =     Was SAT completed no,     If yes proceed with the Spontaneous Breathing Trial (SBT) as long as none of the following exclusion criteria are met:     [] P/FIO2 <100  [] FiO2 >50%  [] Peep >10 CMH2O  [] Patient on vasopressor  [] Failed SAT  [] SpO2 < 88%  [] Active MI  [] Lack of inspiratory effort     If any exclusion criteria are meet Do Not Proceed to SBT.    Was SBT completed no    Ask Physician for a weaning plan yes    Instabilities:    Cardiovascular     CNS      [] Mean BP less than or equal to 75   [] Neuromuscular blockade  [] Heart Rate greater than 130   [] RASS of -3, -4, -5  [] Mechanical Assist Device    []  RASS of +3, +4         [] ICP > 15 or Intracranial Hypertension         Respiratory      Metabolic   [] Temp. (8hrs) < 95 or > 103  [] Respiratory Rate greater than 35   [] WBC < 5000 or > 43769  [] Minute Volume greater than 15L  [] pH less than 7.30  [] Deteriorating chest X-ray         Parameters    no    NIF=  VC=  MV =  RSBI =      Additional Comments: for echo today  no weaning    Performed by Oanh Hearn RCP RRT                 07/08/25 0906   Patient Observation   Pulse 88   Respirations 21   SpO2 98 %   Vent Information   Ventilator Day(s) 2   Ventilator -70   Vent Mode AC/VC   Ventilator Settings   Vt (Set, mL) 450 mL   Resp Rate (Set) 20 bpm   PEEP/CPAP (cmH2O) 8   FiO2  40 %   Peak Inspiratory Flow (Set) 65 L/sec   Vent Patient Data (Readings)   Vt (Measured) 468 mL   Peak Inspiratory Pressure (cmH2O) 21 cmH2O   Rate Measured 21 br/min   Minute Volume (L/min) 9.8 Liters   Mean Airway Pressure (cmH2O) 12 cmH20   Plateau Pressure (cm H2O) 18 cm H2O   Driving Pressure 10   I:E Ratio 1:2.60   Flow Sensitivity 3 L/min   Static Compliance (L/cm H2O) 40   Backup Apnea On   Backup Rate 20 Breaths Per Minute 
EEG Complete, report to follow.  
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  Found to have inferior wall ST MIKA.  Had a stent put in.  He was awake when I went to see him this morning.  He states he is doing okay.  Denies any chest pain or palpitation.  No fever, cough or shortness of breath.  No nausea or vomiting.  No abdominal pain.  OBJECTIVE:     /66   Pulse 65   Temp 98.7 °F (37.1 °C) (Oral)   Resp 22   Ht 1.778 m (5' 10\")   Wt 74.7 kg (164 lb 10.9 oz)   SpO2 96%   BMI 23.63 kg/m²   Patient Vitals for the past 24 hrs:   BP Temp Temp src Pulse Resp SpO2 Weight   07/13/25 0753 108/66 -- -- 65 -- -- --   07/13/25 0600 99/66 -- -- 68 22 96 % 74.7 kg (164 lb 10.9 oz)   07/13/25 0500 107/72 -- -- 70 23 95 % --   07/13/25 0400 119/70 98.7 °F (37.1 °C) Oral 66 18 94 % --   07/13/25 0300 -- -- -- 67 23 96 % --   07/13/25 0200 107/71 -- -- 70 18 95 % --   07/13/25 0100 111/64 -- -- 74 25 96 % --   07/13/25 0000 110/70 98.6 °F (37 °C) Oral 71 21 94 % --   07/12/25 2300 109/70 -- -- 72 18 95 % --   07/12/25 2200 110/60 -- -- 78 26 96 % --   07/12/25 2100 131/85 -- -- 76 29 94 % --   07/12/25 2000 (!) 147/85 98.9 °F (37.2 °C) Oral 75 29 95 % --   07/12/25 1900 129/65 -- -- 85 23 96 % --   07/12/25 1800 110/66 -- -- 77 23 -- --   07/12/25 1727 -- -- -- 78 -- -- --   07/12/25 1700 (!) 101/54 -- -- 79 26 -- --   07/12/25 1600 (!) 86/67 -- -- 80 23 -- --   07/12/25 1200 105/72 -- -- 75 21 -- --   07/12/25 1000 92/70 -- -- 69 19 94 % --   07/12/25 0900 115/72 -- -- 83 20 -- --     24HR INTAKE/OUTPUT:    Intake/Output Summary (Last 24 hours) at 7/13/2025 0804  Last data filed at 7/13/2025 0400  Gross per 24 hour   Intake 480 ml   Output 1925 ml   Net -1445 ml      GENERAL:  Alert, breathing easily, not in apparent acute distress.  EYES:  No pallor, no icterus.    LUNGS:  No obvious increased work of 
MRI screening needs to be completed.    Please make sure your patient can lay flat onto their back. (not kyphotic/SOB/contracted, etc) -if not patient cannot come down to MRI.    If it is stated on screening that they need medicated for claustrophobia/pain/holding still -have the doctor put med orders in.    If patient is a prisoner- check with patients security they have the correct MRI accommodations done as in correct flex/plastic cuffs. Also let MRI know on screening patient is a prisoner.    If patient is on a IV drip that they cannot come off of for MRI- please inform MRI so we can coordinate with the RN that will come down to switch to MRI safe pump.    Thank you from your MRI team.    
Neurosurg progress note  VITALS:  /66   Pulse 65   Temp 98.7 °F (37.1 °C) (Oral)   Resp 22   Ht 1.778 m (5' 10\")   Wt 74.7 kg (164 lb 10.9 oz)   SpO2 96%   BMI 23.63 kg/m²   24HR INTAKE/OUTPUT:    Intake/Output Summary (Last 24 hours) at 7/13/2025 1126  Last data filed at 7/13/2025 0400  Gross per 24 hour   Intake --   Output 1525 ml   Net -1525 ml     XR CHEST PORTABLE  Result Date: 7/9/2025  EXAMINATION: ONE XRAY VIEW OF THE CHEST 7/9/2025 8:02 am COMPARISON: 07/08/2025 HISTORY: ORDERING SYSTEM PROVIDED HISTORY: respiratory failure TECHNOLOGIST PROVIDED HISTORY: Reason for exam:->respiratory failure FINDINGS: NG tube and endotracheal tube are appropriate. Heart is normal in size.  Mediastinal silhouette is within normal limits. Lungs are clear. No pneumothorax or pleural fusions.     1. Support apparatus are appropriate. 2. No significant interval changes of lungs.     Echo (TTE) complete (PRN contrast/bubble/strain/3D)  Result Date: 7/8/2025    Left Ventricle: Low normal left ventricular systolic function. EF by visual approximation is 50%. Left ventricle size is normal. Findings consistent with mild concentric hypertrophy. Minimal inferior hypokinesis. Grade I diastolic dysfunction with normal LAP.   Right Ventricle: Right ventricle size is normal. Normal systolic function.   Image quality is technically difficult. Technically difficult study due to low parasternal window.     XR CHEST PORTABLE  Result Date: 7/8/2025  EXAM: 1 VIEW(S) XRAY OF THE CHEST 07/08/2025 07:50:00 AM COMPARISON: Comparison study from prior day. CLINICAL HISTORY: Respiratory failure. FINDINGS: LUNGS AND PLEURA: Stable bilateral airspace opacities. No pleural effusion. No pneumothorax. HEART AND MEDIASTINUM: Stable heart size, mediastinal and hilar contours. BONES AND SOFT TISSUES: No acute osseous abnormality. Stable endotracheal tube, nasogastric tube.     1. Stable bilateral airspace opacities in the setting of respiratory 
Neurosurg progress note  VITALS:  /74   Pulse 61   Temp 97.9 °F (36.6 °C) (Temporal)   Resp 16   Ht 1.778 m (5' 10\")   Wt 77 kg (169 lb 11.2 oz)   SpO2 98%   BMI 24.35 kg/m²   24HR INTAKE/OUTPUT:    Intake/Output Summary (Last 24 hours) at 7/16/2025 1331  Last data filed at 7/16/2025 1155  Gross per 24 hour   Intake 1080 ml   Output --   Net 1080 ml     XR CHEST PORTABLE  Result Date: 7/9/2025  EXAMINATION: ONE XRAY VIEW OF THE CHEST 7/9/2025 8:02 am COMPARISON: 07/08/2025 HISTORY: ORDERING SYSTEM PROVIDED HISTORY: respiratory failure TECHNOLOGIST PROVIDED HISTORY: Reason for exam:->respiratory failure FINDINGS: NG tube and endotracheal tube are appropriate. Heart is normal in size.  Mediastinal silhouette is within normal limits. Lungs are clear. No pneumothorax or pleural fusions.     1. Support apparatus are appropriate. 2. No significant interval changes of lungs.     Echo (TTE) complete (PRN contrast/bubble/strain/3D)  Result Date: 7/8/2025    Left Ventricle: Low normal left ventricular systolic function. EF by visual approximation is 50%. Left ventricle size is normal. Findings consistent with mild concentric hypertrophy. Minimal inferior hypokinesis. Grade I diastolic dysfunction with normal LAP.   Right Ventricle: Right ventricle size is normal. Normal systolic function.   Image quality is technically difficult. Technically difficult study due to low parasternal window.     XR CHEST PORTABLE  Result Date: 7/8/2025  EXAM: 1 VIEW(S) XRAY OF THE CHEST 07/08/2025 07:50:00 AM COMPARISON: Comparison study from prior day. CLINICAL HISTORY: Respiratory failure. FINDINGS: LUNGS AND PLEURA: Stable bilateral airspace opacities. No pleural effusion. No pneumothorax. HEART AND MEDIASTINUM: Stable heart size, mediastinal and hilar contours. BONES AND SOFT TISSUES: No acute osseous abnormality. Stable endotracheal tube, nasogastric tube.     1. Stable bilateral airspace opacities in the setting of respiratory 
Neurosurg progress note  VITALS:  /81   Pulse 83   Temp 97.2 °F (36.2 °C) (Oral)   Resp 22   Ht 1.778 m (5' 10\")   Wt 75.8 kg (167 lb 1.7 oz)   SpO2 93%   BMI 23.98 kg/m²   24HR INTAKE/OUTPUT:    Intake/Output Summary (Last 24 hours) at 7/11/2025 1303  Last data filed at 7/11/2025 1000  Gross per 24 hour   Intake 200 ml   Output 2425 ml   Net -2225 ml     XR CHEST PORTABLE  Result Date: 7/9/2025  EXAMINATION: ONE XRAY VIEW OF THE CHEST 7/9/2025 8:02 am COMPARISON: 07/08/2025 HISTORY: ORDERING SYSTEM PROVIDED HISTORY: respiratory failure TECHNOLOGIST PROVIDED HISTORY: Reason for exam:->respiratory failure FINDINGS: NG tube and endotracheal tube are appropriate. Heart is normal in size.  Mediastinal silhouette is within normal limits. Lungs are clear. No pneumothorax or pleural fusions.     1. Support apparatus are appropriate. 2. No significant interval changes of lungs.     Echo (TTE) complete (PRN contrast/bubble/strain/3D)  Result Date: 7/8/2025    Left Ventricle: Low normal left ventricular systolic function. EF by visual approximation is 50%. Left ventricle size is normal. Findings consistent with mild concentric hypertrophy. Minimal inferior hypokinesis. Grade I diastolic dysfunction with normal LAP.   Right Ventricle: Right ventricle size is normal. Normal systolic function.   Image quality is technically difficult. Technically difficult study due to low parasternal window.     XR CHEST PORTABLE  Result Date: 7/8/2025  EXAM: 1 VIEW(S) XRAY OF THE CHEST 07/08/2025 07:50:00 AM COMPARISON: Comparison study from prior day. CLINICAL HISTORY: Respiratory failure. FINDINGS: LUNGS AND PLEURA: Stable bilateral airspace opacities. No pleural effusion. No pneumothorax. HEART AND MEDIASTINUM: Stable heart size, mediastinal and hilar contours. BONES AND SOFT TISSUES: No acute osseous abnormality. Stable endotracheal tube, nasogastric tube.     1. Stable bilateral airspace opacities in the setting of 
Neurosurg progress note  VITALS:  BP (!) 152/96   Pulse 91   Temp 99.8 °F (37.7 °C) (Axillary)   Resp 23   Ht 1.778 m (5' 10\")   Wt 75.5 kg (166 lb 7.2 oz)   SpO2 96%   BMI 23.88 kg/m²   24HR INTAKE/OUTPUT:    Intake/Output Summary (Last 24 hours) at 7/9/2025 1139  Last data filed at 7/9/2025 0900  Gross per 24 hour   Intake 3096.2 ml   Output 725 ml   Net 2371.2 ml     Echo (TTE) complete (PRN contrast/bubble/strain/3D)  Result Date: 7/8/2025    Left Ventricle: Low normal left ventricular systolic function. EF by visual approximation is 50%. Left ventricle size is normal. Findings consistent with mild concentric hypertrophy. Minimal inferior hypokinesis. Grade I diastolic dysfunction with normal LAP.   Right Ventricle: Right ventricle size is normal. Normal systolic function.   Image quality is technically difficult. Technically difficult study due to low parasternal window.     XR CHEST PORTABLE  Result Date: 7/8/2025  EXAM: 1 VIEW(S) XRAY OF THE CHEST 07/08/2025 07:50:00 AM COMPARISON: Comparison study from prior day. CLINICAL HISTORY: Respiratory failure. FINDINGS: LUNGS AND PLEURA: Stable bilateral airspace opacities. No pleural effusion. No pneumothorax. HEART AND MEDIASTINUM: Stable heart size, mediastinal and hilar contours. BONES AND SOFT TISSUES: No acute osseous abnormality. Stable endotracheal tube, nasogastric tube.     1. Stable bilateral airspace opacities in the setting of respiratory failure.     CBC:   Lab Results   Component Value Date/Time    WBC 14.1 07/09/2025 04:17 AM    RBC 4.27 07/09/2025 04:17 AM    HGB 14.1 07/09/2025 04:17 AM    HCT 41.2 07/09/2025 04:17 AM    MCV 96.5 07/09/2025 04:17 AM    MCH 33.0 07/09/2025 04:17 AM    MCHC 34.2 07/09/2025 04:17 AM    RDW 14.0 07/09/2025 04:17 AM     07/09/2025 04:17 AM    MPV 11.6 07/09/2025 04:17 AM     BMP:    Lab Results   Component Value Date/Time     07/09/2025 04:17 AM    K 4.1 07/09/2025 04:17 AM     07/09/2025 04:17 
Neurosurg progress note  VITALS:  BP 92/70   Pulse 69   Temp 98.7 °F (37.1 °C) (Oral)   Resp 19   Ht 1.778 m (5' 10\")   Wt 74.6 kg (164 lb 7.4 oz)   SpO2 94%   BMI 23.60 kg/m²   24HR INTAKE/OUTPUT:    Intake/Output Summary (Last 24 hours) at 7/12/2025 1159  Last data filed at 7/12/2025 1010  Gross per 24 hour   Intake 1434.17 ml   Output 1750 ml   Net -315.83 ml     XR CHEST PORTABLE  Result Date: 7/9/2025  EXAMINATION: ONE XRAY VIEW OF THE CHEST 7/9/2025 8:02 am COMPARISON: 07/08/2025 HISTORY: ORDERING SYSTEM PROVIDED HISTORY: respiratory failure TECHNOLOGIST PROVIDED HISTORY: Reason for exam:->respiratory failure FINDINGS: NG tube and endotracheal tube are appropriate. Heart is normal in size.  Mediastinal silhouette is within normal limits. Lungs are clear. No pneumothorax or pleural fusions.     1. Support apparatus are appropriate. 2. No significant interval changes of lungs.     Echo (TTE) complete (PRN contrast/bubble/strain/3D)  Result Date: 7/8/2025    Left Ventricle: Low normal left ventricular systolic function. EF by visual approximation is 50%. Left ventricle size is normal. Findings consistent with mild concentric hypertrophy. Minimal inferior hypokinesis. Grade I diastolic dysfunction with normal LAP.   Right Ventricle: Right ventricle size is normal. Normal systolic function.   Image quality is technically difficult. Technically difficult study due to low parasternal window.     XR CHEST PORTABLE  Result Date: 7/8/2025  EXAM: 1 VIEW(S) XRAY OF THE CHEST 07/08/2025 07:50:00 AM COMPARISON: Comparison study from prior day. CLINICAL HISTORY: Respiratory failure. FINDINGS: LUNGS AND PLEURA: Stable bilateral airspace opacities. No pleural effusion. No pneumothorax. HEART AND MEDIASTINUM: Stable heart size, mediastinal and hilar contours. BONES AND SOFT TISSUES: No acute osseous abnormality. Stable endotracheal tube, nasogastric tube.     1. Stable bilateral airspace opacities in the setting of 
OCCUPATIONAL THERAPY TREATMENT SESSION  [facility]  [address], [place]     Date:2025                                                               Patient Name: Tadeo Santana  MRN: [id number]  : 1970  Room: [address] [place] [address]    Evaluating OT: Lynda Muhammad, OTD,  OTR/L; [id number]  Referring Provider: Manav Downing MD   Specific Provider Orders/Date: OT eval and treat (7/10/25)       Diagnosis: STEMI (ST elevation myocardial infarction) ([facility]) [I21.3]  Acute respiratory failure with hypoxia ([facility]) [J96.01]  ST elevation myocardial infarction (STEMI), unspecified artery ([facility]) [I21.3]     Reason for admission: Pt admitted with STEMI, hypoxia, +CPR, anoxic brain injury    Surgery/Procedures: : extubated, : heart cath, intubated    Pertinent Medical History:    No past medical history on file.     *Precautions:  Fall Risk, cognition, impulsive, alarms    Assessment of current deficits   [x] Functional mobility  [x]ADLs  [x] Strength               [x]Cognition   [x] Functional transfers   [x] IADLs         [x] Safety Awareness   [x]Endurance   [x] Fine Coordination        [] ROM     [] Vision/perception   []Sensation    [x]Gross Motor Coordination [x] Balance   [] Delirium                  []Motor Control     [x] Communication    OT PLAN OF CARE   OT POC based on physician orders, patient diagnosis and results of clinical assessment.       Frequency/Duration: 1-3 days/wk for 1-2 weeks PRN    Specific OT Treatment Interventions to include:   * Instruction/training on adapted ADL techniques and AE recommendations to increase functional independence within precautions       * Training on energy conservation strategies, correct breathing pattern and techniques to improve independence/tolerance for self-care routine  * Functional transfer/mobility training/DME recommendations for increased independence, safety, and fall prevention  * 
OCCUPATIONAL THERAPY TREATMENT SESSION  [facility]  [address] [place], [place]     Date:7/15/2025                                                               Patient Name: Tadeo Santana  MRN: [id number]  : 1970  Room: [address] [place] [address]    Evaluating OT: Lynda Muhammad, OTD,  OTR/L; [id number]  Referring Provider: Manav Downing MD   Specific Provider Orders/Date: OT eval and treat (7/10/25)       Diagnosis: STEMI (ST elevation myocardial infarction) ([facility]) [I21.3]  Acute respiratory failure with hypoxia (HCC) [J96.01]  ST elevation myocardial infarction (STEMI), unspecified artery (HCC) [I21.3]     Reason for admission: Pt admitted with STEMI, hypoxia, +CPR, anoxic brain injury    Surgery/Procedures: : extubated, : heart cath, intubated, : heart cath    Pertinent Medical History:    Past Medical History:   Diagnosis Date    Cardiac arrest ([facility])     Cavernous malformation 2025    History of heart artery stent 2025    Seizure ([facility])         *Precautions:  Fall Risk, cognition, impulsive, alarms    Assessment of current deficits   [x] Functional mobility  [x]ADLs  [x] Strength               [x]Cognition   [x] Functional transfers   [x] IADLs         [x] Safety Awareness   [x]Endurance   [x] Fine Coordination        [] ROM     [] Vision/perception   []Sensation    [x]Gross Motor Coordination [x] Balance   [] Delirium                  []Motor Control     [x] Communication    OT PLAN OF CARE   OT POC based on physician orders, patient diagnosis and results of clinical assessment.       Frequency/Duration: 1-3 days/wk for 1-2 weeks PRN    Specific OT Treatment Interventions to include:   * Instruction/training on adapted ADL techniques and AE recommendations to increase functional independence within precautions       * Training on energy conservation strategies, correct breathing pattern and techniques to improve independence/tolerance 
OCCUPATIONAL THERAPY TREATMENT SESSION  [facility]  [address] [place], [place]     Date:2025                                                               Patient Name: Tadeo Santana  MRN: [id number]  : 1970  Room: [address] [place] [address]    Evaluating OT: Lynda Muhammad, OTD,  OTR/L; [id number]  Referring Provider: Manav Downing MD   Specific Provider Orders/Date: OT eval and treat (7/10/25)       Diagnosis: STEMI (ST elevation myocardial infarction) ([facility]) [I21.3]  Acute respiratory failure with hypoxia (HCC) [J96.01]  ST elevation myocardial infarction (STEMI), unspecified artery (HCC) [I21.3]     Reason for admission: Pt admitted with STEMI, hypoxia, +CPR, anoxic brain injury    Surgery/Procedures: : extubated, : heart cath, intubated, : heart cath    Pertinent Medical History:    Past Medical History:   Diagnosis Date    Cardiac arrest ([facility])     Cavernous malformation 2025    History of heart artery stent 2025    Seizure ([facility])         *Precautions:  Fall Risk, cognition, impulsive, alarms    Assessment of current deficits   [x] Functional mobility  [x]ADLs  [x] Strength               [x]Cognition   [x] Functional transfers   [x] IADLs         [x] Safety Awareness   [x]Endurance   [x] Fine Coordination        [] ROM     [] Vision/perception   []Sensation    [x]Gross Motor Coordination [x] Balance   [] Delirium                  []Motor Control     [x] Communication    OT PLAN OF CARE   OT POC based on physician orders, patient diagnosis and results of clinical assessment.       Frequency/Duration: 1-3 days/wk for 1-2 weeks PRN    Specific OT Treatment Interventions to include:   * Instruction/training on adapted ADL techniques and AE recommendations to increase functional independence within precautions       * Training on energy conservation strategies, correct breathing pattern and techniques to improve independence/tolerance 
OCCUPATIONAL THERAPY TREATMENT SESSION  [facility]  [address], [place]     Date:2025                                                               Patient Name: Tadeo Santana  MRN: [id number]  : 1970  Room: [address] [place] [address]    Evaluating OT: Lynda Muhammad, OTD,  OTR/L; [id number]  Referring Provider: Manav Downing MD   Specific Provider Orders/Date: OT eval and treat (7/10/25)       Diagnosis: STEMI (ST elevation myocardial infarction) ([facility]) [I21.3]  Acute respiratory failure with hypoxia (HCC) [J96.01]  ST elevation myocardial infarction (STEMI), unspecified artery (HCC) [I21.3]     Reason for admission: Pt admitted with STEMI, hypoxia, +CPR, anoxic brain injury    Surgery/Procedures: : extubated, : heart cath, intubated, : heart cath    Pertinent Medical History:    Past Medical History:   Diagnosis Date    Cardiac arrest ([facility])     Cavernous malformation 2025    History of heart artery stent 2025    Seizure ([facility])         *Precautions:  Fall Risk, cognition, impulsive, alarms    Assessment of current deficits   [x] Functional mobility  [x]ADLs  [x] Strength               [x]Cognition   [x] Functional transfers   [x] IADLs         [x] Safety Awareness   [x]Endurance   [x] Fine Coordination        [] ROM     [] Vision/perception   []Sensation    [x]Gross Motor Coordination [x] Balance   [] Delirium                  []Motor Control     [x] Communication    OT PLAN OF CARE   OT POC based on physician orders, patient diagnosis and results of clinical assessment.       Frequency/Duration: 1-3 days/wk for 1-2 weeks PRN    Specific OT Treatment Interventions to include:   * Instruction/training on adapted ADL techniques and AE recommendations to increase functional independence within precautions       * Training on energy conservation strategies, correct breathing pattern and techniques to improve independence/tolerance 
Patient admitted to MICU from CVL with no belongings.  
Patient got out of bed to urinate. No family present at bedside. Patient again made aware he is not to get out of bed without calling for the nurse as his gait is unsteady. Patient verbalized understanding. Dr. Downing made aware and ativan 0.5mg x 1 ordered. Bed alarm on.   
Patient pulls at lines/tubes while being unrestrained, unable to redirect at this time.   
Patient slightly agitated and very restless. Spoke to patients wife and she advised patient does drink about a 6 pack of beer, enough to get drunk, 3-4 times per week. Patient did not sleep at all last night as well. Patient impulsive and verbalizes feeling anxious. Patient advised multiple times not to get up without calling for nurse which patient verbalized understanding. Family advised to let nurses know if they are leaving patients bedside. Bed alarm on. Dr. Downing made aware and patient started on Phenobarbital.   
Patient with small bruised hematoma above heart cath site on forearm, Pulse 2+ palpable, no bleeding noted from site. Cardiology updated on this.  
Physical Therapy    Daily Treatment Note       Name: Tadeo Santana  : 1970  MRN: [id number]      Date of Service: 2025    Evaluating PT:  Vance Guzmán, PT, DPT  [id number]     Room #:  4403/4403-A  Diagnosis:  STEMI (ST elevation myocardial infarction) ([facility]) [I21.3]  Acute respiratory failure with hypoxia ([facility]) [J96.01]  ST elevation myocardial infarction (STEMI), unspecified artery ([facility]) [I21.3]  PMHx/PSHx:   has no past medical history on file.   Procedure/Surgery:  none this admission   Precautions:  Falls, alarms, cognition  Equipment Needs:  TBD    SUBJECTIVE:    Pt lives with wife in a 2 story home with 3 stairs and 1 rail to enter.  Bedroom and bathroom are on the 1 level 1.  Pt ambulated with no AD independently PTA.    OBJECTIVE:   Initial Evaluation  Date: 7/10/25 Treatment  25 Short Term/ Long Term   Goals   AM-PAC 6 Clicks 14/24 15/24    Was pt agreeable to Eval/treatment? Yes  Yes     Does pt have pain? Pt c/o minimal pain in throat during cough No reported pain     Bed Mobility  Rolling: Shreya  Supine to sit: Shreya  Sit to supine: NT  Scooting: Shreya Rolling: Min A  Supine to sit: Min A  Sit to supine: NT  Scooting: Min A Rolling: Independent    Supine to sit: Independent    Sit to supine: Independent    Scooting: Independent     Transfers Sit to stand: ModA  Stand to sit: ModA  Stand pivot: ModA Sit to stand: Min A  Stand to sit: Min A  Stand pivot: Min A with no AD Sit to stand: Independent    Stand to sit: Independent    Stand pivot: Independent   with no AD   Ambulation    50 feet x2 with ModA  with  feet with no AD Mod A  >200 feet with Independent  with no AD   Stair negotiation: ascended and descended NT NT >3 steps with 1 rail Independent  with no AD   ROM BUE:  Per OT eval   BLE:  WFL      Strength BUE:  Per OT eval   BLE:  WFL      Balance Sitting EOB:  Shreya  Dynamic Standing:  ModA with WW Sitting EOB:  CGA  Dynamic Standing:  ModA with no AD Sitting EOB:  Independent  
Physical Therapy   Initial Assessment     Name: Tadeo Santana  : 1970  MRN: [id number]      Date of Service: 7/10/2025    Evaluating PT:  Vance Guzmán, PT, DPT  [id number]     Room #:  4403/4403-A  Diagnosis:  STEMI (ST elevation myocardial infarction) ([facility]) [I21.3]  Acute respiratory failure with hypoxia ([facility]) [J96.01]  ST elevation myocardial infarction (STEMI), unspecified artery ([facility]) [I21.3]  PMHx/PSHx:   has no past medical history on file.   Procedure/Surgery:  none this admission   Precautions:  Falls, alarms, cognition  Equipment Needs:  TBD    SUBJECTIVE:    Pt lives with wife in a 2 story home with 3 stairs and 1 rail to enter.  Bedroom and bathroom are on the 1 level 1.  Pt ambulated with no AD independently PTA.    OBJECTIVE:   Initial Evaluation  Date: 7/10/25 Treatment Short Term/ Long Term   Goals   AM-PAC 6 Clicks      Was pt agreeable to Eval/treatment? Yes      Does pt have pain? Pt c/o minimal pain in throat during cough     Bed Mobility  Rolling: Shreya  Supine to sit: Shreya  Sit to supine: NT  Scooting: Shreya  Rolling: Independent    Supine to sit: Independent    Sit to supine: Independent    Scooting: Independent     Transfers Sit to stand: ModA  Stand to sit: ModA  Stand pivot: ModA  Sit to stand: Independent    Stand to sit: Independent    Stand pivot: Independent   with no AD   Ambulation    50 feet x2 with ModA  with WW  >200 feet with Independent  with no AD   Stair negotiation: ascended and descended NT  >3 steps with 1 rail Independent  with no AD   ROM BUE:  Per OT eval   BLE:  WFL      Strength BUE:  Per OT eval   BLE:  WFL      Balance Sitting EOB:  Shreya  Dynamic Standing:  ModA with WW  Sitting EOB:  Independent    Dynamic Standing:  Independent  with no AD     Pt is A & O x 4, Pt demonstrates poor insight into deficits and needs verbal cues to stay on task. Pt easily distracted.  Pt demonstrated deficits in short tem memeory unable to remember 3 items during session.   RASS: 
Physical Therapy  Treatment Note    Name: Tadeo Santana  : 1970  MRN: [id number]      Date of Service: 2025    Evaluating PT:  Vance Guzmán, PT, DPT  [id number]     Room #:  6515/6515-A  Diagnosis:  STEMI (ST elevation myocardial infarction) (HCC) [I21.3]  Acute respiratory failure with hypoxia (HCC) [J96.01]  ST elevation myocardial infarction (STEMI), unspecified artery (HCC) [I21.3]  PMHx/PSHx:   has a past medical history of Cardiac arrest (HCC), Cavernous malformation, History of heart artery stent, and Seizure ([facility]).   has a past surgical history that includes Cardiac procedure (N/A, 2025); Cardiac procedure (N/A, 2025); and Cardiac procedure (N/A, 2025).  Procedure/Surgery:  Cardiac catheterization (2025)  Precautions:  Falls, seizure precautions  Equipment Needs:  TBD    SUBJECTIVE:    Pt lives with wife in a 2 story home with 3 stairs and 1 rail to enter.  Bedroom and bathroom are on the 1 level 1.  Pt ambulated with no AD independently PTA.    OBJECTIVE:   Initial Evaluation  Date: 7/10/25 Treatment  2025 Short Term/ Long Term   Goals   AM-PAC 6 Clicks     Was pt agreeable to Eval/treatment? Yes  Yes     Does pt have pain? Pt c/o minimal pain in throat during cough No c/o pain    Bed Mobility  Rolling: Shreya  Supine to sit: Shreya  Sit to supine: NT  Scooting: Shreya Rolling: NT  Supine to sit: NT  Sit to supine: NT  Scooting: NT Rolling: Independent    Supine to sit: Independent    Sit to supine: Independent    Scooting: Independent     Transfers Sit to stand: ModA  Stand to sit: ModA  Stand pivot: ModA Sit to stand: NT  Stand to sit: SBA  Stand pivot: NT Sit to stand: Independent    Stand to sit: Independent    Stand pivot: Independent   with no AD   Ambulation    50 feet x2 with ModA  with  feet with no AD SBA >200 feet with Independent  with no AD   Stair negotiation: ascended and descended NT 4 steps with 1 rail SBA >3 steps with 1 rail Independent  with no 
Physical Therapy  Treatment Note    Name: Tadeo Santana  : 1970  MRN: [id number]      Date of Service: 2025    Evaluating PT:  Vance Guzmán PT, DPT  [id number]     Room #:  6515/6515-A  Diagnosis:  STEMI (ST elevation myocardial infarction) (HCC) [I21.3]  Acute respiratory failure with hypoxia (HCC) [J96.01]  ST elevation myocardial infarction (STEMI), unspecified artery (HCC) [I21.3]  PMHx/PSHx:   has a past medical history of Cardiac arrest ([facility]), Cavernous malformation, History of heart artery stent, and Seizure ([facility]).   has a past surgical history that includes Cardiac procedure (N/A, 2025); Cardiac procedure (N/A, 2025); Cardiac procedure (N/A, 2025); Cardiac procedure (N/A, 2025); Cardiac procedure (N/A, 2025); and Cardiac procedure (N/A, 2025).  Procedure/Surgery:  Cardiac catheterization (2025)  Precautions:  Falls, seizure precautions  Equipment Needs:  TBD    SUBJECTIVE:    Pt lives with wife in a 2 story home with 3 stairs and 1 rail to enter.  Bedroom and bathroom are on the 1 level 1.  Pt ambulated with no AD independently PTA.    OBJECTIVE:   Initial Evaluation  Date: 7/10/25 Treatment  2025 Short Term/ Long Term   Goals   AM-PAC 6 Clicks     Was pt agreeable to Eval/treatment? Yes  Yes     Does pt have pain? Pt c/o minimal pain in throat during cough No c/o pain    Bed Mobility  Rolling: Shreya  Supine to sit: Shreya  Sit to supine: NT  Scooting: Shreya Rolling: NT  Supine to sit: NT  Sit to supine: NT  Scooting: NT Rolling: Independent    Supine to sit: Independent    Sit to supine: Independent    Scooting: Independent     Transfers Sit to stand: ModA  Stand to sit: ModA  Stand pivot: ModA Sit to stand: SBA  Stand to sit: SBA  Stand pivot: NT Sit to stand: Independent    Stand to sit: Independent    Stand pivot: Independent   with no AD   Ambulation    50 feet x2 with ModA  with  feet x2 with no AD SBA >200 feet with Independent  with no AD 
Pt ok to dc from Dr Zuleika CALVERT  
RT Nebulizer Bronchodilator Protocol Note    There is a bronchodilator order in the chart from a provider indicating to follow the RT Bronchodilator Protocol and there is an “Initiate RT Bronchodilator Protocol” order as well (see protocol at bottom of note).    CXR Findings:  XR CHEST PORTABLE  Result Date: 7/10/2025  Cardiomegaly with mild central congestion. No overt edema or focal consolidation.     XR CHEST PORTABLE  Result Date: 7/9/2025  1. Support apparatus are appropriate. 2. No significant interval changes of lungs.       The findings from the last RT Protocol Assessment were as follows:  Smoking: (P) Smoker 15 pack years or more  Respiratory Pattern: (P) Regular pattern and RR 12-20 bpm  Breath Sounds: (P) Slightly diminished and/or crackles  Cough: (P) Unable to generate effective cough  Indication for Bronchodilator Therapy: (P) Decreased or absent breath sounds  Bronchodilator Assessment Score: (P) 7    Aerosolized bronchodilator medication orders have been revised according to the RT Nebulizer Bronchodilator Protocol below.    Respiratory Therapist to perform RT Therapy Protocol Assessment initially then follow the protocol.  Repeat RT Therapy Protocol Assessment PRN for score 0-3 or on second treatment, BID, and PRN for scores above 3.    No Indications - adjust the frequency to every 6 hours PRN wheezing or bronchospasm, if no treatments needed after 48 hours then discontinue using Per Protocol order mode.     If indication present, adjust the RT bronchodilator orders based on the Bronchodilator Assessment Score as indicated below.  If a patient is on this medication at home then do not decrease Frequency below that used at home.    0-3 - enter or revise RT bronchodilator order(s) to equivalent RT Bronchodilator order with Frequency of every 4 hours PRN for wheezing or increased work of breathing using Per Protocol order mode.       4-6 - enter or revise RT Bronchodilator order(s) to two equivalent 
SPEECH LANGUAGE PATHOLOGY  DAILY PROGRESS NOTE        PATIENT NAME:  Tadeo Santana      :  1970          TODAY'S DATE:  2025 [facility]    Patient was seen for ongoing cognitive linguistic therapy to target his reasoning and memory. His mother was present. During the session, patient was pleasant and sitting upright in a chair. He completed a home and community problem-solving activity to improve his reasoning. Patient solved the situations with 71% accuracy given minimal assistance. He benefits from minimal verbal cues to improve his safety awareness. His mother reported that she feels her son is still impulsive. Therapist educated patient on slowing down as well as double checking himself with daily tasks to reduce error. He verbalized his understanding. Patient's biggest barrier is his reduced recall. He utilized an association technique to recall names of people after a 2-5 minute delay to improve his recent memory. Patient completed the activity with 70% accuracy given minimal assistance. It should be noted that he requires minimal verbal cues to carry-over the compensatory memory strategies to improve his functional recall. Furthermore, he was recommended to use a pill organizer and set alarms to improve his recall of medication management as well as create \"to-do\" lists to improve his organization and recall of daily activities. Overall, patient is recommended for speech therapy at discharge to improve his functional recall, safety, and independence in his daily life.       CPT code(s) 88284  therapeutic interventions that focus on cognitive function , initial  15 min  81916  therapeutic interventions that focus on cognitive function, each additional 15 min  Total minutes :  30 minutes    Adilson Trejo M.S., CCC-SLP/L   Speech-Language Pathologist  [id number]        
SPEECH LANGUAGE PATHOLOGY  DAILY PROGRESS NOTE        PATIENT NAME:  Tadeo Santana      :  1970          TODAY'S DATE:  2025 ROOM:  4403/4403-A    Patient was seen for ongoing cognitive linguistic therapy to target his memory. Wife and family were present at bedside. Wife expressed concerns about patient's safety and memory deficit. Therapist provided patient's wife with an additional handout on tips for promoting safety and independence as well as information on cognitive problems after a brain injury. She verbalized her understanding. Wife also reported that she carried over the recommendation of keeping a notebook in patient's hospital room to improve his recall of who visited. During the session, therapist educated patient on internal/external memory strategies to improve his functional recall. Patient utilized a categorization and rehearsal strategy to recall sets of 4 words after a short delay to improve his recent memory. He required minimal assistance to apply the strategies to recall the sets of words with 75% accuracy. Patient benefits from verbal repetition, associations, and writing information down to improve his recall. Overall, patient is recommended to continue speech therapy services to improve his functional recall and independence in his daily life.       CPT code(s) 03935  therapeutic interventions that focus on cognitive function , initial  15 min  30709  therapeutic interventions that focus on cognitive function, each additional 15 min  Total minutes :  30 minutes      Adilson Trejo M.S., CCC-SLP/L   Speech-Language Pathologist  [id number]        
SPEECH LANGUAGE PATHOLOGY  DAILY PROGRESS NOTE        PATIENT NAME:  Tadeo Santana      :  1970          TODAY'S DATE:  7/15/2025 ROOM:  [address] [place], [place] [address]A    Patient was seen for an ongoing cognitive linguistic therapy session to target his memory. He was sitting upright and oriented x4. Patient reported that he feels his memory is \"slightly improving.\" He recognized the therapist from the previous session but did not independently recall any compensatory memory strategies taught. Therapist reviewed the strategies and provided him with another handout to promote his functional recall. Patient applied a rehearsal and association strategy to complete a delayed recall task. He recalled information from picture scenes after a 2-3 minute delay with 86% accuracy given minimal assistance. Patient is recommended to use external memory strategies, such as making lists, keeping a calendar, etc. to improve his ability to recall day to day events and organization. He is also recommended to continue speech therapy services to improve his functional recall and independence in his daily life.       CPT code(s) 68624  therapeutic interventions that focus on cognitive function , initial  15 min  08127  therapeutic interventions that focus on cognitive function, each additional 15 min  Total minutes :  30 minutes     Adilson Trejo M.S., CCC-SLP/L   Speech-Language Pathologist  [id number]       
SPEECH/LANGUAGE PATHOLOGY  SPEECH/LANGUAGE/COGNITIVE EVALUATION   and PLAN OF CARE      PATIENT NAME:  Tadeo Santana  (male)     MRN:  [id number]    :  1970  (54 y.o.)  STATUS:  Inpatient: Room 4403/4403-A    TODAY'S DATE:  7/10/2025  ORDER DATE, DESCRIPTION AND REFERRING PROVIDER :    SLP eval and treat  Start:  07/10/25 0915,   End:  07/10/25 0915,   ONE TIME,   Standing Count:  1 Occurrences,   R       Jocelynn Tellez, APRN - CNP  REASON FOR REFERRAL:  Cognitive Evaluation   EVALUATING THERAPIST: LAURA Spence    ADMITTING DIAGNOSIS: STEMI (ST elevation myocardial infarction) (HCC) [I21.3]  Acute respiratory failure with hypoxia (HCC) [J96.01]  ST elevation myocardial infarction (STEMI), unspecified artery (HCC) [I21.3]    VISIT DIAGNOSIS:   Visit Diagnoses         Codes      Acute respiratory failure with hypoxia (HCC)     J96.01               SPEECH THERAPY  PLAN OF CARE   The speech therapy  POC is established based on physician order, speech pathology diagnosis and results of clinical assessment     SPEECH PATHOLOGY DIAGNOSIS:   Patient presents with mild-moderate cognitive deficits in the areas of recent memory, reasoning, organization, and executive functions.     Speech Pathology intervention is recommended 1-3 times per week for LOS or when goals are met with emphasis on the following:      Conditions Requiring Skilled Therapeutic Intervention for speech, language and/or cognition    Cognitive linguistic impairment  Decreased safety awareness  Decreased short term memory  Decreased problem solving skills   Decreased thought organization    Specific Speech Therapy Interventions to Include:   Therapeutic tasks for Cognition    Specific instructions for next treatment:    To initiate POC  To initiate memory tasks  To initiate thought organization tasks    SHORT/LONG TERM GOALS  Pt will improve orientation to spatial and temporal surroundings with use of external memory aides.  Pt will improve 
Spontaneous Parameters performed    VT = 425 ml  f = 18  B/M  Ve = 7.59 L/M  NIF = -35  cmH2O  VC = 1.0 L  RSBI = 42      Performed by Isai Ko RCP  
The  visited the patient who was unable to be assessed at this time. The  actively listened to the patient's friend as he reminisced about there many years of friendship. The patients friend requested prayer for his friend. The  provided prayer and sustaining ministry presence. If additional support is needed please reach out to Spiritual Health (ext [phone]).    Rev EULA Horne MDIV,      
The  will follow up with the patient for spiritual assessment on Friday. The  stepped in and spoke with the patient and his sister-in-law for a brief visit will continue to follow as needed.  If additional support is needed please reach out to Spiritual Health (ext [phone]).    Rev EULA Horne MDIV,      
for cognitive function  Await cardiology recommendations,   Patient medically stable for discharge   Neurosurgery signed off    Code status: FULL  Consultants:  Cardiology,Neurosurgery    DVT Prophylaxis   PT/OT  Discharge planning         ROSALINA Castillo - CNP,  12:09 PM  7/15/2025  
distal RCA 7/7/2025.  OM 2: 85% stenosis.  I discussed with neurosurgery.  Will hold on staging for the procedures until neurologic recovery and neurosurgery is able to perform a neurologic exam.  We discussed the need for dual antiplatelet therapy.  He has high risk drug-eluting stents.  Venous malformation (cavernoma) is a low pressure system.  Therefore, there is some risk of bleeding but is not high and the risk of holding DAPT in the setting would be far greater.  Okay to continue DAPT.  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  Head trauma.  Lungs are clear, saturating well.  Elevated EDP on cardiac catheterization yesterday.  Elevated BNP.  I will give 1 low-dose of Lasix today.  Increase beta-blocker today.    Note: This report was completed using computerized voice recognition software. Every effort has been made to ensure accuracy, however; and invert and computerized transcription errors may be present.      
seizures  *Tobacco use  PLAN:  ICU care  Beta-blocker, ticagrelor, aspirin.  On Keppra  PT OT  DVT  prophylaxis enoxaparin   for discharge planning  Discharge when stable    Electronically signed by Lida Lu MD on 7/12/2025 at 7:53 AM    
end of session to ensure patient safety and to increase efficiency of session.  Skilled monitoring of HR, O2 saturation, blood pressure and patient's response to activity performed throughout session.    Comments: Pt case discussed in rounds, OK from RN to see patient. Evaluation completed with PT collaboration d/t decreased functional status of patient and extensive line management. Upon arrival, patient semi supine in bed with mother present. Pt pleasant and agreeable to participate in therapy session. Pt educated on the role of OT.  Therapist facilitated ADL tasks & bed mobility to address safety awareness, implementation of fall prevention strategies, & functional engagement throughout daily activities.  Pt demo fair+ tolerance with limited understanding of education/techniques. Pt educated on POC, precautions, fall prevention with limited retention of education and safety awareness. Pt limited by attention/cognition/safety awareness. At end of session, patient properly positioned in chair position, alarm engaged with call light within reach, all lines and tubes intact. Pt instructed on use of call light for assistance and fall prevention. Nursing notified of patient positioning.    Patient presents with decreased ROM/strength, activity tolerance, dynamic balance, functional mobility limiting completion of ADLs and safety.  Pt can benefit from continued skilled OT services to increase safety, functional independence and quality of life.     Rehab Potential: Fair+ for established goals    Patient / Family Goal: to return to PLOF    Patient and/or family were instructed/educated on diagnosis, prognosis/goals and plan of care. Patient demonstrated fair understanding.      Evaluation Complexity: Moderate     History: Expanded chart review of consults, imaging, and psychosocial history related to current functional performance.   Exam: 5+ performance deficits identified limiting functional independence and safe 
7/12/2025] PHENobarbital **FOLLOWED BY** [START ON 7/14/2025] PHENobarbital, benzocaine-menthol, phenol, oxyCODONE-acetaminophen, sodium chloride flush, sodium chloride, potassium chloride **OR** potassium chloride, magnesium sulfate, ondansetron **OR** ondansetron, polyethylene glycol, acetaminophen **OR** acetaminophen  Allergy            : Patient has no known allergies.  Immunization      :   There is no immunization history on file for this patient.          Labs and Imaging Studies                  CBC  :  Recent Labs     07/09/25  0417 07/10/25  0420 07/11/25  0504   WBC 14.1* 10.6 8.5   RBC 4.27 4.16 4.25   HGB 14.1 13.7 14.1   HCT 41.2 39.8 39.6   MCV 96.5 95.7 93.2   MCH 33.0 32.9 33.2   MCHC 34.2 34.4 35.6*   RDW 14.0 13.2 12.8    173 211   MPV 11.6 11.8 11.3     Comprehensive :   Recent Labs     07/09/25  0417 07/10/25  0420 07/11/25  0504    135* 136   K 4.1 4.8 3.5    103 100   CO2 19* 21* 24   BUN 13 12 13   CREATININE 0.8 0.7 0.9   GLUCOSE 129* 105* 107*   CALCIUM 8.6 9.0 9.2   BILITOT 0.6 0.6 0.6   ALKPHOS 60 61 62   * 156* 87*   * 93* 75*     Cardiac :   Lab Results   Component Value Date    CKTOTAL 6,602 (H) 07/07/2025     Lab Results   Component Value Date    PROBNP 2,057 (H) 07/08/2025     PRO-BNP : No results for input(s): \"BNP\" in the last 72 hours.   BNP: No results for input(s): \"BNP\" in the last 72 hours.  Lactic Acid : @BRIEFLAB(LACTA:3)    Lipase  : No results for input(s): \"LIPASE\" in the last 72 hours.    Sed rate : No results found for: \"SEDRATE\"  CReactive Protein: No results found for: \"CRP\"  Globulins :   No results for input(s): \"IGG\" in the last 72 hours.  No results for input(s): \"IGM\" in the last 72 hours.  No results for input(s): \"IGA\" in the last 72 hours.  No results for input(s): \"IGG1\" in the last 72 hours.  No results for input(s): \"IGG2\" in the last 72 hours.  No results for input(s): \"IGG3\" in the last 72 hours.  No results for input(s): 
input(s): \"BNP\" in the last 72 hours.  Lactic Acid : @BRIEFLAB(LACTA:3)    Lipase  : No results for input(s): \"LIPASE\" in the last 72 hours.    Sed rate : No results found for: \"SEDRATE\"  CReactive Protein: No results found for: \"CRP\"  Globulins :   No results for input(s): \"IGG\" in the last 72 hours.  No results for input(s): \"IGM\" in the last 72 hours.  No results for input(s): \"IGA\" in the last 72 hours.  No results for input(s): \"IGG1\" in the last 72 hours.  No results for input(s): \"IGG2\" in the last 72 hours.  No results for input(s): \"IGG3\" in the last 72 hours.  No results for input(s): \"IGG4\" in the last 72 hours.       Magnesium  Lab Results   Component Value Date/Time    MG 2.0 07/09/2025 04:17 AM     Phosphorus  Lab Results   Component Value Date/Time    PHOS 2.3 07/09/2025 04:17 AM     Ionized CalciumNo results found for: \"CAION\"     COVID-19 Labs:  Lab Results   Component Value Date/Time    COVID19 Not Detected 07/07/2025 08:18 PM       Notable Cultures:    Blood cultures No results found for: \"BC\" No results for input(s): \"BC\" in the last 72 hours. No results for input(s): \"BLOODCULT2\" in the last 72 hours. No results for input(s): \"LABURIN\" in the last 72 hours.  Respiratory cultures No results found for: \"RESPCULTURE\" No results found for: \"LABGRAM\"  Urine No results found for: \"LABURIN\"  Legionella No results found for: \"LABLEGI\"  C Diff PCR No results found for: \"CDIFPCR\"  Wound culture/abscess: No results for input(s): \"WNDABS\" in the last 72 hours.  Tip culture:No results for input(s): \"CXCATHTIP\" in the last 72 hours.  ------------------------------------------------------------  Organism No results found for: \"ORG\"  Aerobic No components found for: \"LABEARO\"  Anaerobic No results found for: \"LABANAE\"  ------------------------------------------------------------  Strep pneumo No results found for: \"SPNEUAGU\"  HSV No results found for: \"HSVSRC\"  FLU No results found for: \"FLUA\" No results found

## 2025-07-16 NOTE — DISCHARGE SUMMARY
[facility]   Discharge summary   Patient ID:  Tadeo MASON [facility]  [id number]  54 y.o.  1970    Admit date: 7/7/2025    Discharge date and time: 7/16/2025    Admission Diagnoses:   Patient Active Problem List   Diagnosis    Acute myocardial infarction (HCC)    Hypoxic encephalopathy (HCC)    Myoclonus    Anoxic brain injury (HCC)       Discharge Diagnoses: STEMI    Consults: cardiology, pulmonary/intensive care, neurology, neurosurgery     Procedures: EEG , Left heart cath x 2     Hospital Course: The patient is a 54 y.o. male of Carolin León DO   Plan: Patient is a 54 yr old male admitted for cardiac arrest, STEMI.      07/14/2025  Vital signs stable  Heart cath with Staged PCI, stenting of the proximal OM with 2 MAGAN today  Plan for cardiac rehab outpatient  AM-PAC score 19/24   Neurosurgery following for Cavernoma- on Keppra  CXR improving -slight pulmonary venous congestion  Discharge when stable   Appreciate all consultants input     07/15/2025  Vital signs remain stable   BUN/creatinine-at baseline 11/0.8  Speech eval for cognitive function  Await cardiology recommendations,   Patient medically stable for discharge   Neurosurgery signed off    07/16/2025  Patient educated about Brilinta   Discussed smoking cessation  Patient is to follow-up with cardiology and consultants within two weeks   Discussed cardiac rehab  Patient is medically stable for discharge       No results for input(s): \"WBC\", \"HGB\", \"HCT\", \"PLT\" in the last 72 hours.    Recent Labs     07/15/25  0532      K 4.3      CO2 23   BUN 11   CREATININE 0.8   CALCIUM 8.9       XR CHEST PORTABLE  Result Date: 7/9/2025  EXAMINATION: ONE XRAY VIEW OF THE CHEST 7/9/2025 8:02 am COMPARISON: 07/08/2025 HISTORY: ORDERING SYSTEM PROVIDED HISTORY: respiratory failure TECHNOLOGIST PROVIDED HISTORY: Reason for exam:->respiratory failure FINDINGS: NG tube and endotracheal tube are appropriate. Heart is normal in size.  Mediastinal

## 2025-07-16 NOTE — PLAN OF CARE
Problem: Discharge Planning  Goal: Discharge to home or other facility with appropriate resources  7/15/2025 2118 by Felisha Truong, RN  Outcome: Progressing     Problem: Safety - Adult  Goal: Free from fall injury  7/15/2025 2118 by Felisha Truong, RN  Outcome: Progressing

## 2025-07-19 LAB
EKG ATRIAL RATE: 68 BPM
EKG ATRIAL RATE: 69 BPM
EKG ATRIAL RATE: 73 BPM
EKG P AXIS: 70 DEGREES
EKG P AXIS: 77 DEGREES
EKG P AXIS: 78 DEGREES
EKG P-R INTERVAL: 144 MS
EKG P-R INTERVAL: 144 MS
EKG P-R INTERVAL: 146 MS
EKG Q-T INTERVAL: 412 MS
EKG Q-T INTERVAL: 418 MS
EKG Q-T INTERVAL: 422 MS
EKG QRS DURATION: 84 MS
EKG QRS DURATION: 86 MS
EKG QRS DURATION: 88 MS
EKG QTC CALCULATION (BAZETT): 444 MS
EKG QTC CALCULATION (BAZETT): 452 MS
EKG QTC CALCULATION (BAZETT): 453 MS
EKG R AXIS: 58 DEGREES
EKG R AXIS: 66 DEGREES
EKG R AXIS: 66 DEGREES
EKG T AXIS: -43 DEGREES
EKG T AXIS: -60 DEGREES
EKG T AXIS: -62 DEGREES
EKG VENTRICULAR RATE: 68 BPM
EKG VENTRICULAR RATE: 69 BPM
EKG VENTRICULAR RATE: 73 BPM

## 2025-07-21 LAB
EKG ATRIAL RATE: 64 BPM
EKG P AXIS: 87 DEGREES
EKG P-R INTERVAL: 146 MS
EKG Q-T INTERVAL: 436 MS
EKG QRS DURATION: 84 MS
EKG QTC CALCULATION (BAZETT): 449 MS
EKG R AXIS: 81 DEGREES
EKG T AXIS: -56 DEGREES
EKG VENTRICULAR RATE: 64 BPM

## 2025-08-08 ENCOUNTER — TELEPHONE (OUTPATIENT)
Dept: CARDIOLOGY CLINIC | Age: 55
End: 2025-08-08

## 2025-08-11 ENCOUNTER — OFFICE VISIT (OUTPATIENT)
Dept: CARDIOLOGY CLINIC | Age: 55
End: 2025-08-11
Payer: COMMERCIAL

## 2025-08-11 VITALS
WEIGHT: 161 LBS | BODY MASS INDEX: 23.05 KG/M2 | HEART RATE: 48 BPM | SYSTOLIC BLOOD PRESSURE: 109 MMHG | RESPIRATION RATE: 18 BRPM | DIASTOLIC BLOOD PRESSURE: 67 MMHG | HEIGHT: 70 IN

## 2025-08-11 DIAGNOSIS — I24.9 ACUTE CORONARY SYNDROME (HCC): ICD-10-CM

## 2025-08-11 DIAGNOSIS — I25.10 CORONARY ARTERY DISEASE INVOLVING NATIVE CORONARY ARTERY OF NATIVE HEART WITHOUT ANGINA PECTORIS: Primary | ICD-10-CM

## 2025-08-11 DIAGNOSIS — E78.5 HYPERLIPIDEMIA, UNSPECIFIED HYPERLIPIDEMIA TYPE: ICD-10-CM

## 2025-08-11 PROBLEM — G93.1 ANOXIC BRAIN INJURY (HCC): Status: RESOLVED | Noted: 2025-07-09 | Resolved: 2025-08-11

## 2025-08-11 PROBLEM — G25.3 MYOCLONUS: Status: RESOLVED | Noted: 2025-07-09 | Resolved: 2025-08-11

## 2025-08-11 PROBLEM — G93.1 HYPOXIC ENCEPHALOPATHY (HCC): Status: RESOLVED | Noted: 2025-07-08 | Resolved: 2025-08-11

## 2025-08-11 PROBLEM — I21.9 ACUTE MYOCARDIAL INFARCTION (HCC): Status: RESOLVED | Noted: 2025-07-07 | Resolved: 2025-08-11

## 2025-08-11 PROCEDURE — G2211 COMPLEX E/M VISIT ADD ON: HCPCS | Performed by: INTERNAL MEDICINE

## 2025-08-11 PROCEDURE — 93000 ELECTROCARDIOGRAM COMPLETE: CPT | Performed by: INTERNAL MEDICINE

## 2025-08-11 PROCEDURE — 99214 OFFICE O/P EST MOD 30 MIN: CPT | Performed by: INTERNAL MEDICINE

## 2025-08-15 ENCOUNTER — TELEPHONE (OUTPATIENT)
Dept: CARDIAC REHAB | Age: 55
End: 2025-08-15

## 2025-08-27 ENCOUNTER — HOSPITAL ENCOUNTER (OUTPATIENT)
Dept: CARDIOLOGY | Age: 55
Discharge: HOME | End: 2025-08-29
Attending: INTERNAL MEDICINE
Payer: COMMERCIAL

## 2025-08-27 VITALS
BODY MASS INDEX: 23.05 KG/M2 | DIASTOLIC BLOOD PRESSURE: 67 MMHG | SYSTOLIC BLOOD PRESSURE: 109 MMHG | HEIGHT: 70 IN | WEIGHT: 161 LBS

## 2025-08-27 DIAGNOSIS — I24.9 ACUTE CORONARY SYNDROME (HCC): ICD-10-CM

## 2025-08-27 PROCEDURE — 93306 TTE W/DOPPLER COMPLETE: CPT

## 2025-08-28 ENCOUNTER — RESULTS FOLLOW-UP (OUTPATIENT)
Dept: CARDIOLOGY CLINIC | Age: 55
End: 2025-08-28

## 2025-08-28 LAB
ECHO AO ASC DIAM: 2.9 CM
ECHO AO ASCENDING AORTA INDEX: 1.53 CM/M2
ECHO AO SINUS VALSALVA DIAM: 3.2 CM
ECHO AO SINUS VALSALVA INDEX: 1.68 CM/M2
ECHO AO ST JNCT DIAM: 2.4 CM
ECHO AV AREA PEAK VELOCITY: 2.2 CM2
ECHO AV AREA VTI: 2 CM2
ECHO AV AREA/BSA PEAK VELOCITY: 1.2 CM2/M2
ECHO AV AREA/BSA VTI: 1.1 CM2/M2
ECHO AV CUSP MM: 2 CM
ECHO AV MEAN GRADIENT: 7 MMHG
ECHO AV MEAN VELOCITY: 1.2 M/S
ECHO AV PEAK GRADIENT: 14 MMHG
ECHO AV PEAK VELOCITY: 1.9 M/S
ECHO AV VELOCITY RATIO: 0.68
ECHO AV VTI: 38.3 CM
ECHO BSA: 1.9 M2
ECHO EST RA PRESSURE: 3 MMHG
ECHO IVC PROX: 2.1 CM
ECHO LA DIAMETER INDEX: 2 CM/M2
ECHO LA DIAMETER: 3.8 CM
ECHO LA VOL A-L A2C: 64 ML (ref 18–58)
ECHO LA VOL A-L A4C: 54 ML (ref 18–58)
ECHO LA VOL MOD A2C: 61 ML (ref 18–58)
ECHO LA VOL MOD A4C: 50 ML (ref 18–58)
ECHO LA VOLUME AREA LENGTH: 61 ML
ECHO LA VOLUME INDEX A-L A2C: 34 ML/M2 (ref 16–34)
ECHO LA VOLUME INDEX A-L A4C: 28 ML/M2 (ref 16–34)
ECHO LA VOLUME INDEX AREA LENGTH: 32 ML/M2 (ref 16–34)
ECHO LA VOLUME INDEX MOD A2C: 32 ML/M2 (ref 16–34)
ECHO LA VOLUME INDEX MOD A4C: 26 ML/M2 (ref 16–34)
ECHO LV CARDIAC INDEX: 2.3 L/MIN/M2
ECHO LV E' LATERAL VELOCITY: 0.1 CM/S
ECHO LV E' SEPTAL VELOCITY: 0.06 CM/S
ECHO LV EDV A2C: 88 ML
ECHO LV EDV A4C: 99 ML
ECHO LV EDV BP: 95 ML (ref 67–155)
ECHO LV EDV INDEX A4C: 52 ML/M2
ECHO LV EDV INDEX BP: 50 ML/M2
ECHO LV EDV NDEX A2C: 46 ML/M2
ECHO LV EF PHYSICIAN: 60 %
ECHO LV EJECTION FRACTION A2C: 54 %
ECHO LV EJECTION FRACTION A4C: 65 %
ECHO LV EJECTION FRACTION BIPLANE: 60 % (ref 55–100)
ECHO LV ESV A2C: 41 ML
ECHO LV ESV A4C: 35 ML
ECHO LV ESV BP: 38 ML (ref 22–58)
ECHO LV ESV INDEX A2C: 22 ML/M2
ECHO LV ESV INDEX A4C: 18 ML/M2
ECHO LV ESV INDEX BP: 20 ML/M2
ECHO LV FRACTIONAL SHORTENING: 31 % (ref 28–44)
ECHO LV INTERNAL DIMENSION DIASTOLE INDEX: 2.74 CM/M2
ECHO LV INTERNAL DIMENSION DIASTOLIC: 5.2 CM (ref 4.2–5.9)
ECHO LV INTERNAL DIMENSION SYSTOLIC INDEX: 1.89 CM/M2
ECHO LV INTERNAL DIMENSION SYSTOLIC: 3.6 CM
ECHO LV ISOVOLUMETRIC RELAXATION TIME (IVRT): 96.9 MS
ECHO LV IVSD: 1.4 CM (ref 0.6–1)
ECHO LV IVSS: 1.8 CM
ECHO LV MASS 2D: 293.8 G (ref 88–224)
ECHO LV MASS INDEX 2D: 154.6 G/M2 (ref 49–115)
ECHO LV POSTERIOR WALL DIASTOLIC: 1.3 CM (ref 0.6–1)
ECHO LV POSTERIOR WALL SYSTOLIC: 1.7 CM
ECHO LV RELATIVE WALL THICKNESS RATIO: 0.5
ECHO LVOT AREA: 3.1 CM2
ECHO LVOT AV VTI INDEX: 0.64
ECHO LVOT CARDIAC OUTPUT: 4.3 L/MIN
ECHO LVOT DIAM: 2 CM
ECHO LVOT MEAN GRADIENT: 3 MMHG
ECHO LVOT PEAK GRADIENT: 6 MMHG
ECHO LVOT PEAK VELOCITY: 1.3 M/S
ECHO LVOT STROKE VOLUME INDEX: 40.3 ML/M2
ECHO LVOT SV: 76.6 ML
ECHO LVOT VTI: 24.4 CM
ECHO MV "A" WAVE DURATION: 124.6 MSEC
ECHO MV A VELOCITY: 0.54 M/S
ECHO MV AREA PHT: 2.2 CM2
ECHO MV AREA VTI: 2.2 CM2
ECHO MV E DECELERATION TIME (DT): 283.9 MS
ECHO MV E VELOCITY: 0.76 M/S
ECHO MV E/A RATIO: 1.41
ECHO MV E/E' LATERAL: 760
ECHO MV E/E' RATIO (AVERAGED): 1013.33
ECHO MV E/E' SEPTAL: 1266.67
ECHO MV EROA PISA: 0.1 CM2
ECHO MV LVOT VTI INDEX: 1.43
ECHO MV MAX VELOCITY: 1 M/S
ECHO MV MEAN GRADIENT: 1 MMHG
ECHO MV MEAN VELOCITY: 0.5 M/S
ECHO MV PEAK GRADIENT: 4 MMHG
ECHO MV PRESSURE HALF TIME (PHT): 98.9 MS
ECHO MV REGURGITANT ALIASING (NYQUIST) VELOCITY: 50 CM/S
ECHO MV REGURGITANT RADIUS PISA: 0.39 CM
ECHO MV REGURGITANT VELOCITY PISA: 5 M/S
ECHO MV REGURGITANT VOLUME PISA: 16.22 ML
ECHO MV REGURGITANT VTIA: 169.8 CM
ECHO MV VTI: 35 CM
ECHO PULMONARY ARTERY END DIASTOLIC PRESSURE: 6 MMHG
ECHO PV MAX VELOCITY: 1.2 M/S
ECHO PV MEAN GRADIENT: 3 MMHG
ECHO PV MEAN VELOCITY: 0.8 M/S
ECHO PV PEAK GRADIENT: 5 MMHG
ECHO PV REGURGITANT MAX VELOCITY: 1.2 M/S
ECHO PV VTI: 24.7 CM
ECHO PVEIN A DURATION: 120 MS
ECHO PVEIN A VELOCITY: 0.3 M/S
ECHO PVEIN PEAK D VELOCITY: 0.7 M/S
ECHO PVEIN PEAK S VELOCITY: 0.7 M/S
ECHO PVEIN S/D RATIO: 1
ECHO RA AREA 4C: 21.3 CM2
ECHO RA VOLUME AREA LENGTH APICAL 4 CHAMBER: 61 ML
ECHO RIGHT VENTRICULAR SYSTOLIC PRESSURE (RVSP): 29 MMHG
ECHO RV BASAL DIMENSION: 3.1 CM
ECHO RV INTERNAL DIMENSION: 2.7 CM
ECHO RV LONGITUDINAL DIMENSION: 7.1 CM
ECHO RV MID DIMENSION: 2.5 CM
ECHO RV TAPSE: 3.1 CM (ref 1.7–?)
ECHO TV REGURGITANT MAX VELOCITY: 2.57 M/S
ECHO TV REGURGITANT PEAK GRADIENT: 26 MMHG

## 2025-08-28 PROCEDURE — 93306 TTE W/DOPPLER COMPLETE: CPT | Performed by: INTERNAL MEDICINE

## 2025-09-04 ENCOUNTER — HOSPITAL ENCOUNTER (OUTPATIENT)
Dept: CARDIAC REHAB | Age: 55
Setting detail: THERAPIES SERIES
Discharge: HOME | End: 2025-09-04
Payer: COMMERCIAL

## 2025-09-04 VITALS
RESPIRATION RATE: 20 BRPM | HEART RATE: 60 BPM | OXYGEN SATURATION: 97 % | WEIGHT: 169 LBS | HEIGHT: 70 IN | BODY MASS INDEX: 24.2 KG/M2

## 2025-09-04 PROCEDURE — 93798 PHYS/QHP OP CAR RHAB W/ECG: CPT

## 2025-09-04 PROCEDURE — 93797 PHYS/QHP OP CAR RHAB WO ECG: CPT

## 2025-09-04 ASSESSMENT — PATIENT HEALTH QUESTIONNAIRE - PHQ9
5. POOR APPETITE OR OVEREATING: NOT AT ALL
2. FEELING DOWN, DEPRESSED OR HOPELESS: NOT AT ALL
7. TROUBLE CONCENTRATING ON THINGS, SUCH AS READING THE NEWSPAPER OR WATCHING TELEVISION: NOT AT ALL
1. LITTLE INTEREST OR PLEASURE IN DOING THINGS: NOT AT ALL
9. THOUGHTS THAT YOU WOULD BE BETTER OFF DEAD, OR OF HURTING YOURSELF: NOT AT ALL
SUM OF ALL RESPONSES TO PHQ QUESTIONS 1-9: 1
6. FEELING BAD ABOUT YOURSELF - OR THAT YOU ARE A FAILURE OR HAVE LET YOURSELF OR YOUR FAMILY DOWN: SEVERAL DAYS
SUM OF ALL RESPONSES TO PHQ QUESTIONS 1-9: 1
10. IF YOU CHECKED OFF ANY PROBLEMS, HOW DIFFICULT HAVE THESE PROBLEMS MADE IT FOR YOU TO DO YOUR WORK, TAKE CARE OF THINGS AT HOME, OR GET ALONG WITH OTHER PEOPLE: NOT DIFFICULT AT ALL
SUM OF ALL RESPONSES TO PHQ QUESTIONS 1-9: 1
8. MOVING OR SPEAKING SO SLOWLY THAT OTHER PEOPLE COULD HAVE NOTICED. OR THE OPPOSITE, BEING SO FIGETY OR RESTLESS THAT YOU HAVE BEEN MOVING AROUND A LOT MORE THAN USUAL: NOT AT ALL
3. TROUBLE FALLING OR STAYING ASLEEP: NOT AT ALL
SUM OF ALL RESPONSES TO PHQ QUESTIONS 1-9: 1
4. FEELING TIRED OR HAVING LITTLE ENERGY: NOT AT ALL

## 2025-09-05 ENCOUNTER — HOSPITAL ENCOUNTER (OUTPATIENT)
Dept: CARDIAC REHAB | Age: 55
Setting detail: THERAPIES SERIES
Discharge: HOME | End: 2025-09-05
Payer: COMMERCIAL

## 2025-09-05 PROCEDURE — 93798 PHYS/QHP OP CAR RHAB W/ECG: CPT

## (undated) DEVICE — DEVICE TORQ FLRESCNT PNK FOR HEMSTAS VLV

## (undated) DEVICE — COPILOT BLEEDBACK CONTROL VALVE: Brand: COPILOT

## (undated) DEVICE — KIT ANGIO W/ AT P65 PREM HND CTRL FOR CNTRST DEL ANGIOTOUCH

## (undated) DEVICE — CANNULA NSL CANN NSL L25FT TBNG AD O2 SUP SFT UC

## (undated) DEVICE — CATHETER THROMCTMY 6FR WRK L138CM EXTR FOR 2MM VES PRONTO

## (undated) DEVICE — KIT MFLD ISOLATN NACL CNTRST PRT TBNG SPIK W/ PRSS TRNSDUC

## (undated) DEVICE — CATH BLLN ANGIO 2X6MM SC EUPHORA RX

## (undated) DEVICE — GUIDEWIRE VASC J 0.035 INX300 CM INTERMED SHAPEABLE TIP WHLY

## (undated) DEVICE — GUIDEWIRE WITH ICE™ HYDROPHILIC COATING: Brand: LUGE™

## (undated) DEVICE — CATHETER GUID 6FR DIA0.071IN SHFT NYL STD L JR 4 CRV ENH

## (undated) DEVICE — INFLATION DEVICE KIT: Brand: ENCORE™ 26 ADVANTAGE KIT

## (undated) DEVICE — ANGIOPLASTY ADD-ON PACK: Brand: MEDLINE INDUSTRIES, INC.

## (undated) DEVICE — TUBING PRSS MON L12IN PVC RIG NONEXPANDING M TO FEM CONN

## (undated) DEVICE — ANGIOGRAPHIC CATHETER: Brand: EXPO™

## (undated) DEVICE — Device

## (undated) DEVICE — PAD, DEFIB, ADULT, RADIOTRAN, PHYSIO, LO: Brand: MEDLINE

## (undated) DEVICE — BAND COMPR L24CM REG CLR PLAS HEMSTAT EXT HK AND LOOP RETEN

## (undated) DEVICE — CATHETER DIAG 5FR L100CM LUMN ID0.047IN JL3.5 CRV 0 SIDE H

## (undated) DEVICE — CATHETER BLLN SPRINTER OTW 138CM 12MM DIA2.5MM CROSSING PROF

## (undated) DEVICE — GLIDESHEATH SLENDER ACCESS KIT: Brand: GLIDESHEATH SLENDER

## (undated) DEVICE — CATHETER GUID 6FR L100CM DIA0.071IN NYL SHFT EBU3.5